# Patient Record
Sex: MALE | Race: WHITE | HISPANIC OR LATINO | Employment: FULL TIME | URBAN - METROPOLITAN AREA
[De-identification: names, ages, dates, MRNs, and addresses within clinical notes are randomized per-mention and may not be internally consistent; named-entity substitution may affect disease eponyms.]

---

## 2023-08-23 ENCOUNTER — TELEPHONE (OUTPATIENT)
Dept: FAMILY MEDICINE CLINIC | Facility: CLINIC | Age: 33
End: 2023-08-23

## 2023-08-23 ENCOUNTER — OFFICE VISIT (OUTPATIENT)
Dept: FAMILY MEDICINE CLINIC | Facility: CLINIC | Age: 33
End: 2023-08-23
Payer: COMMERCIAL

## 2023-08-23 VITALS
TEMPERATURE: 97.9 F | OXYGEN SATURATION: 100 % | HEIGHT: 73 IN | BODY MASS INDEX: 33.93 KG/M2 | HEART RATE: 58 BPM | SYSTOLIC BLOOD PRESSURE: 136 MMHG | WEIGHT: 256 LBS | DIASTOLIC BLOOD PRESSURE: 84 MMHG

## 2023-08-23 DIAGNOSIS — Z11.4 SCREENING FOR HIV (HUMAN IMMUNODEFICIENCY VIRUS): ICD-10-CM

## 2023-08-23 DIAGNOSIS — Z00.00 ANNUAL PHYSICAL EXAM: Primary | ICD-10-CM

## 2023-08-23 DIAGNOSIS — Z11.59 NEED FOR HEPATITIS C SCREENING TEST: ICD-10-CM

## 2023-08-23 DIAGNOSIS — J30.2 SEASONAL ALLERGIES: ICD-10-CM

## 2023-08-23 DIAGNOSIS — J06.9 UPPER RESPIRATORY TRACT INFECTION, UNSPECIFIED TYPE: ICD-10-CM

## 2023-08-23 DIAGNOSIS — N62 GYNECOMASTIA: ICD-10-CM

## 2023-08-23 DIAGNOSIS — L21.0 DANDRUFF IN ADULT: ICD-10-CM

## 2023-08-23 PROCEDURE — 87636 SARSCOV2 & INF A&B AMP PRB: CPT

## 2023-08-23 PROCEDURE — 99385 PREV VISIT NEW AGE 18-39: CPT

## 2023-08-23 PROCEDURE — 87651 STREP A DNA AMP PROBE: CPT

## 2023-08-23 RX ORDER — FLUTICASONE PROPIONATE 50 MCG
1 SPRAY, SUSPENSION (ML) NASAL DAILY
Qty: 18.2 ML | Refills: 0 | Status: SHIPPED | OUTPATIENT
Start: 2023-08-23

## 2023-08-23 RX ORDER — SELENIUM SULFIDE 2.5 MG/100ML
LOTION TOPICAL DAILY PRN
Qty: 118 ML | Refills: 0 | Status: SHIPPED | OUTPATIENT
Start: 2023-08-23

## 2023-08-23 RX ORDER — CETIRIZINE HYDROCHLORIDE 10 MG/1
10 TABLET ORAL DAILY
Qty: 90 TABLET | Refills: 0 | Status: SHIPPED | OUTPATIENT
Start: 2023-08-23

## 2023-08-23 NOTE — ASSESSMENT & PLAN NOTE
Large breast since childhood  Would like surgically removed   Referral placed to plastics   BMI 33.78

## 2023-08-23 NOTE — ASSESSMENT & PLAN NOTE
Symptom onset: 08/20/23   Covid/strep swab done today 08/23/23     Make sure you have a thermometer and if you feel chills or sweats check it and write it down. Take Tylenol for fevers, body aches, and headaches  Drink plenty of fluids to stay well hydrated at least 2 L per day  • Hot water with lemon or honey, warm tea, Can drink Gatorade/Powerade zero, mix with water    Use over-the-counter saline nasal spray/allergy medication for congestion, runny nose, and postnasal drip  • Over the counter Mucinex to help clear mucus. Delsym is a cough suppressant.    • Vicks to the front/back of the chest bottom of the feet with socks   Stay out of work/school until afebrile >24 hours without use of antipyretics

## 2023-08-23 NOTE — PROGRESS NOTES
[address] [place] [facility]    NAME: Black Olmedo  AGE: 29 y.o. SEX: male  : 1990     DATE: 2023     Assessment and Plan:     Problem List Items Addressed This Visit        Respiratory    Upper respiratory tract infection     Symptom onset: 23   Covid/strep swab done today 23     Make sure you have a thermometer and if you feel chills or sweats check it and write it down. Take Tylenol for fevers, body aches, and headaches  Drink plenty of fluids to stay well hydrated at least 2 L per day  • Hot water with lemon or honey, warm tea, Can drink Gatorade/Powerade zero, mix with water    Use over-the-counter saline nasal spray/allergy medication for congestion, runny nose, and postnasal drip  • Over the counter Mucinex to help clear mucus. Delsym is a cough suppressant.    • Vicks to the front/back of the chest bottom of the feet with socks   Stay out of work/school until afebrile >24 hours without use of antipyretics         Relevant Orders    Strep A PCR    Covid/Flu- Office Collect       Musculoskeletal and Integument    Dandruff in adult     Dry scalp   Has tried head and shoulders with no relief   Prescription grade shampoo sent to pharmacy          Relevant Medications    selenium sulfide (SELSUN) 2.5 % shampoo       Other    BMI 33.0-33.9,adult     Patient reportedly follows a keto diet with no effects on weight loss   Encouraged to change diet to a low carb diet as the keto diet is not sustainable long term   Physically demanding job and also goes to the gym   Interested in medication for weight loss   Referral to weight management          Relevant Orders    Lipid panel    CBC and Platelet    Comprehensive metabolic panel    Ambulatory referral to Weight Management    Gynecomastia     Large breast since childhood  Would like surgically removed   Referral placed to plastics   BMI 33.78          Relevant Orders Ambulatory Referral to Plastic Surgery    Seasonal allergies     Frequent seasonal allergies in the summer   Cetirizine and fluticasone ordered          Relevant Medications    cetirizine (ZyrTEC) 10 mg tablet    fluticasone (FLONASE) 50 mcg/act nasal spray   Other Visit Diagnoses     Annual physical exam    -  Primary    Relevant Orders    Lipid panel    Comprehensive metabolic panel    Need for hepatitis C screening test        Relevant Orders    Hepatitis C Antibody    Screening for HIV (human immunodeficiency virus)        Relevant Orders    HIV 1/2 AG/AB w Reflex [facility] for 2 yr old and above          Immunizations and preventive care screenings were discussed with patient today. Appropriate education was printed on patient's after visit summary. Counseling:  Alcohol/drug use: discussed moderation in alcohol intake, the recommendations for healthy alcohol use, and avoidance of illicit drug use. Dental Health: discussed importance of regular tooth brushing, flossing, and dental visits. Injury prevention: discussed safety/seat belts, safety helmets, smoke detectors, carbon dioxide detectors, and smoking near bedding or upholstery. Sexual health: discussed sexually transmitted diseases, partner selection, use of condoms, avoidance of unintended pregnancy, and contraceptive alternatives. · Exercise: the importance of regular exercise/physical activity was discussed. Recommend exercise 3-5 times per week for at least 30 minutes. Return in 1 year (on 8/23/2024). Chief Complaint:     Chief Complaint   Patient presents with   • Rash   • Sinusitis     Seasonal allergieS   • Physical Exam     Would like weight loss recommendation, would like referral for men breast   • Sore Throat     WHITE PATCHES ON TONSILS, X3 DAYS      History of Present Illness:     Adult Annual Physical   Patient here for a comprehensive physical exam. The patient reports problems - URI.     Diet and Physical Activity  · Diet/Nutrition: well balanced diet. · Exercise: 3-4 times a week on average. Depression Screening  PHQ-2/9 Depression Screening    Little interest or pleasure in doing things: 0 - not at all  Feeling down, depressed, or hopeless: 0 - not at all  PHQ-2 Score: 0  PHQ-2 Interpretation: Negative depression screen       General Health  · Sleep: sleeps well. · Hearing: normal - none . · Vision: no vision problems. · Dental: regular dental visits.  Health  · History of STDs?: no.     Review of Systems:     Review of Systems   Constitutional: Negative for activity change, chills, fatigue and fever. HENT: Negative for congestion, ear pain, rhinorrhea and sore throat. Eyes: Negative for pain and visual disturbance. Respiratory: Negative for cough, chest tightness and shortness of breath. Cardiovascular: Negative for chest pain, palpitations and leg swelling. Gastrointestinal: Negative for abdominal pain, constipation, diarrhea, nausea and vomiting. Genitourinary: Negative for decreased urine volume, dysuria, frequency, hematuria and urgency. Musculoskeletal: Negative for arthralgias and back pain. Skin: Negative for color change and rash. Neurological: Negative for seizures, syncope and headaches. Psychiatric/Behavioral: Negative for dysphoric mood. The patient is not nervous/anxious. All other systems reviewed and are negative. Past Medical History:     History reviewed. No pertinent past medical history. Past Surgical History:     History reviewed. No pertinent surgical history.    Social History:     Social History     Socioeconomic History   • Marital status: Single     Spouse name: None   • Number of children: None   • Years of education: None   • Highest education level: None   Occupational History   • None   Tobacco Use   • Smoking status: Some Days     Types: Cigarettes, Cigars     Start date: 2022   • Smokeless tobacco: Never   Vaping Use   • Vaping Use: Never used   Substance and Sexual Activity   • Alcohol use: Yes     Comment: SOCIALLY   • Drug use: Never   • Sexual activity: None   Other Topics Concern   • None   Social History Narrative   • None     Social Determinants of Health     Financial Resource Strain: Not on file   Food Insecurity: Not on file   Transportation Needs: Not on file   Physical Activity: Not on file   Stress: Not on file   Social Connections: Not on file   Intimate Partner Violence: Not on file   Housing Stability: Not on file      Family History:     Family History   Problem Relation Age of Onset   • Hypertension Mother    • Diabetes Mother    • Lung disease Mother    • No Known Problems Father    • Depression Sister    • No Known Problems Sister    • No Known Problems Brother    • No Known Problems Brother    • No Known Problems Brother       Current Medications:     Current Outpatient Medications   Medication Sig Dispense Refill   • cetirizine (ZyrTEC) 10 mg tablet Take 1 tablet (10 mg total) by mouth daily 90 tablet 0   • fluticasone (FLONASE) 50 mcg/act nasal spray 1 spray into each nostril daily 18.2 mL 0   • selenium sulfide (SELSUN) 2.5 % shampoo Apply topically daily as needed for dandruff 118 mL 0     No current facility-administered medications for this visit. Allergies:     No Known Allergies   Physical Exam:     /84 (BP Location: Left arm, Patient Position: Sitting, Cuff Size: Adult)   Pulse 58   Temp 97.9 °F (36.6 °C) (Temporal)   Ht 6' 1" (1.854 m)   Wt 116 kg (256 lb)   SpO2 100%   BMI 33.78 kg/m²     Physical Exam  Vitals and nursing note reviewed. Constitutional:       Appearance: Normal appearance. He is well-developed. HENT:      Head: Normocephalic and atraumatic. Right Ear: Tympanic membrane, ear canal and external ear normal. There is no impacted cerumen. Left Ear: Tympanic membrane, ear canal and external ear normal. There is no impacted cerumen.       Nose: Nose normal. Mouth/Throat:      Mouth: Mucous membranes are moist.      Pharynx: Oropharynx is clear. Eyes:      Extraocular Movements: Extraocular movements intact. Conjunctiva/sclera: Conjunctivae normal.      Pupils: Pupils are equal, round, and reactive to light. Cardiovascular:      Rate and Rhythm: Normal rate and regular rhythm. Pulses: Normal pulses. Heart sounds: Normal heart sounds. No murmur heard. Pulmonary:      Effort: Pulmonary effort is normal. No respiratory distress. Breath sounds: Normal breath sounds. Abdominal:      General: Bowel sounds are normal.      Palpations: Abdomen is soft. Tenderness: There is no abdominal tenderness. Musculoskeletal:         General: No swelling. Normal range of motion. Cervical back: Normal range of motion and neck supple. Right lower leg: No edema. Left lower leg: No edema. Skin:     General: Skin is warm and dry. Capillary Refill: Capillary refill takes less than 2 seconds. Neurological:      General: No focal deficit present. Mental Status: He is alert and oriented to person, place, and time. Mental status is at baseline. Psychiatric:         Mood and Affect: Mood normal.         Behavior: Behavior normal.         Thought Content: Thought content normal.         Judgment: Judgment normal.        TARA Gracia   [address]     BMI Counseling: Body mass index is 33.78 kg/m². The BMI is above normal. Nutrition recommendations include decreasing overall calorie intake, 3-5 servings of fruits/vegetables daily, decreasing soda and/or juice intake, increasing intake of lean protein and reducing intake of saturated fat and trans fat. Exercise recommendations include moderate aerobic physical activity for 150 minutes/week.

## 2023-08-23 NOTE — ASSESSMENT & PLAN NOTE
Patient reportedly follows a keto diet with no effects on weight loss   Encouraged to change diet to a low carb diet as the keto diet is not sustainable long term   Physically demanding job and also goes to the gym   Interested in medication for weight loss   Referral to weight management

## 2023-08-23 NOTE — ASSESSMENT & PLAN NOTE
Dry scalp   Has tried head and shoulders with no relief   Prescription grade shampoo sent to pharmacy

## 2023-08-23 NOTE — PATIENT INSTRUCTIONS
Wellness Visit for Adults   AMBULATORY CARE:   A wellness visit  is when you see your healthcare provider to get screened for health problems. Your healthcare provider will also give you advice on how to stay healthy. Write down your questions so you remember to ask them. Ask your healthcare provider how often you should have a wellness visit. What happens at a wellness visit:  Your healthcare provider will ask about your health, and your family history of health problems. This includes high blood pressure, heart disease, and cancer. He or she will ask if you have symptoms that concern you, if you smoke, and about your mood. You may also be asked about your intake of medicines, supplements, food, and alcohol. Any of the following may be done:  • Your weight  will be checked. Your height may also be checked so your body mass index (BMI) can be calculated. Your BMI shows if you are at a healthy weight. • Your blood pressure  and heart rate will be checked. Your temperature may also be checked. • Blood and urine tests  may be done. Blood tests may be done to check your cholesterol levels. Abnormal cholesterol levels increase your risk for heart disease and stroke. You may also need a blood or urine test to check for diabetes if you are at increased risk. Urine tests may be done to look for signs of an infection or kidney disease. • A physical exam  includes checking your heartbeat and lungs with a stethoscope. Your healthcare provider may also check your skin to look for sun damage. • Screening tests  may be recommended. A screening test is done to check for diseases that may not cause symptoms. The screening tests you may need depend on your age, gender, family history, and lifestyle habits. For example, colorectal screening may be recommended if you are 48years old or older. Screening tests you need if you are a woman:   • A Pap smear  is used to screen for cervical cancer.  Pap smears are usually done every 3 to 5 years depending on your age. You may need them more often if you have had abnormal Pap smear test results in the past. Ask your healthcare provider how often you should have a Pap smear. • A mammogram  is an x-ray of your breasts to screen for breast cancer. Experts recommend mammograms every 2 years starting at age 48 years. You may need a mammogram at age 52 years or younger if you have an increased risk for breast cancer. Talk to your healthcare provider about when you should start having mammograms and how often you need them. Vaccines you may need:   • Get an influenza vaccine  every year. The influenza vaccine protects you from the flu. Several types of viruses cause the flu. The viruses change over time, so new vaccines are made each year. • Get a tetanus-diphtheria (Td) booster vaccine  every 10 years. This vaccine protects you against tetanus and diphtheria. Tetanus is a severe infection that may cause painful muscle spasms and lockjaw. Diphtheria is a severe bacterial infection that causes a thick covering in the back of your mouth and throat. • Get a human papillomavirus (HPV) vaccine  if you are female and aged 23 to 32 or male 23 to 24 and never received it. This vaccine protects you from HPV infection. HPV is the most common infection spread by sexual contact. HPV may also cause vaginal, penile, and anal cancers. • Get a pneumococcal vaccine  if you are aged 72 years or older. The pneumococcal vaccine is an injection given to protect you from pneumococcal disease. Pneumococcal disease is an infection caused by pneumococcal bacteria. The infection may cause pneumonia, meningitis, or an ear infection. • Get a shingles vaccine  if you are 60 or older, even if you have had shingles before. The shingles vaccine is an injection to protect you from the varicella-zoster virus. This is the same virus that causes chickenpox.  Shingles is a painful rash that develops in people who had chickenpox or have been exposed to the virus. How to eat healthy:  My Plate is a model for planning healthy meals. It shows the types and amounts of foods that should go on your plate. Fruits and vegetables make up about half of your plate, and grains and protein make up the other half. A serving of dairy is included on the side of your plate. The amount of calories and serving sizes you need depends on your age, gender, weight, and height. Examples of healthy foods are listed below:  • Eat a variety of vegetables  such as dark green, red, and orange vegetables. You can also include canned vegetables low in sodium (salt) and frozen vegetables without added butter or sauces. • Eat a variety of fresh fruits , canned fruit in 100% juice, frozen fruit, and dried fruit. • Include whole grains. At least half of the grains you eat should be whole grains. Examples include whole-wheat bread, wheat pasta, brown rice, and whole-grain cereals such as oatmeal.    • Eat a variety of protein foods such as seafood (fish and shellfish), lean meat, and poultry without skin (turkey and chicken). Examples of lean meats include pork leg, shoulder, or tenderloin, and beef round, sirloin, tenderloin, and extra lean ground beef. Other protein foods include eggs and egg substitutes, beans, peas, soy products, nuts, and seeds. • Choose low-fat dairy products such as skim or 1% milk or low-fat yogurt, cheese, and cottage cheese. • Limit unhealthy fats  such as butter, hard margarine, and shortening. Exercise:  Exercise at least 30 minutes per day on most days of the week. Some examples of exercise include walking, biking, dancing, and swimming. You can also fit in more physical activity by taking the stairs instead of the elevator or parking farther away from stores. Include muscle strengthening activities 2 days each week. Regular exercise provides many health benefits.  It helps you manage your weight, and decreases your risk for type 2 diabetes, heart disease, stroke, and high blood pressure. Exercise can also help improve your mood. Ask your healthcare provider about the best exercise plan for you. General health and safety guidelines:   • Do not smoke. Nicotine and other chemicals in cigarettes and cigars can cause lung damage. Ask your healthcare provider for information if you currently smoke and need help to quit. E-cigarettes or smokeless tobacco still contain nicotine. Talk to your healthcare provider before you use these products. • Limit alcohol. A drink of alcohol is 12 ounces of beer, 5 ounces of wine, or 1½ ounces of liquor. • Lose weight, if needed. Being overweight increases your risk of certain health conditions. These include heart disease, high blood pressure, type 2 diabetes, and certain types of cancer. • Protect your skin. Do not sunbathe or use tanning beds. Use sunscreen with a SPF 15 or higher. Apply sunscreen at least 15 minutes before you go outside. Reapply sunscreen every 2 hours. Wear protective clothing, hats, and sunglasses when you are outside. • Drive safely. Always wear your seatbelt. Make sure everyone in your car wears a seatbelt. A seatbelt can save your life if you are in an accident. Do not use your cell phone when you are driving. This could distract you and cause an accident. Pull over if you need to make a call or send a text message. • Practice safe sex. Use latex condoms if are sexually active and have more than one partner. Your healthcare provider may recommend screening tests for sexually transmitted infections (STIs). • Wear helmets, lifejackets, and protective gear. Always wear a helmet when you ride a bike or motorcycle, go skiing, or play sports that could cause a head injury. Wear protective equipment when you play sports. Wear a lifejacket when you are on a boat or doing water sports.     © Copyright [employer] 2022 Information is for End User's use only and may not be sold, redistributed or otherwise used for commercial purposes. The above information is an  only. It is not intended as medical advice for individual conditions or treatments. Talk to your doctor, nurse or pharmacist before following any medical regimen to see if it is safe and effective for you. Wellness Visit for Adults   AMBULATORY CARE:   A wellness visit  is when you see your healthcare provider to get screened for health problems. Your healthcare provider will also give you advice on how to stay healthy. Write down your questions so you remember to ask them. Ask your healthcare provider how often you should have a wellness visit. What happens at a wellness visit:  Your healthcare provider will ask about your health, and your family history of health problems. This includes high blood pressure, heart disease, and cancer. He or she will ask if you have symptoms that concern you, if you smoke, and about your mood. You may also be asked about your intake of medicines, supplements, food, and alcohol. Any of the following may be done:  • Your weight  will be checked. Your height may also be checked so your body mass index (BMI) can be calculated. Your BMI shows if you are at a healthy weight. • Your blood pressure  and heart rate will be checked. Your temperature may also be checked. • Blood and urine tests  may be done. Blood tests may be done to check your cholesterol levels. Abnormal cholesterol levels increase your risk for heart disease and stroke. You may also need a blood or urine test to check for diabetes if you are at increased risk. Urine tests may be done to look for signs of an infection or kidney disease. • A physical exam  includes checking your heartbeat and lungs with a stethoscope. Your healthcare provider may also check your skin to look for sun damage. • Screening tests  may be recommended.  A screening test is done to check for diseases that may not cause symptoms. The screening tests you may need depend on your age, gender, family history, and lifestyle habits. For example, colorectal screening may be recommended if you are 48years old or older. Screening tests you need if you are a woman:   • A Pap smear  is used to screen for cervical cancer. Pap smears are usually done every 3 to 5 years depending on your age. You may need them more often if you have had abnormal Pap smear test results in the past. Ask your healthcare provider how often you should have a Pap smear. • A mammogram  is an x-ray of your breasts to screen for breast cancer. Experts recommend mammograms every 2 years starting at age 48 years. You may need a mammogram at age 52 years or younger if you have an increased risk for breast cancer. Talk to your healthcare provider about when you should start having mammograms and how often you need them. Vaccines you may need:   • Get an influenza vaccine  every year. The influenza vaccine protects you from the flu. Several types of viruses cause the flu. The viruses change over time, so new vaccines are made each year. • Get a tetanus-diphtheria (Td) booster vaccine  every 10 years. This vaccine protects you against tetanus and diphtheria. Tetanus is a severe infection that may cause painful muscle spasms and lockjaw. Diphtheria is a severe bacterial infection that causes a thick covering in the back of your mouth and throat. • Get a human papillomavirus (HPV) vaccine  if you are female and aged 23 to 32 or male 23 to 24 and never received it. This vaccine protects you from HPV infection. HPV is the most common infection spread by sexual contact. HPV may also cause vaginal, penile, and anal cancers. • Get a pneumococcal vaccine  if you are aged 72 years or older. The pneumococcal vaccine is an injection given to protect you from pneumococcal disease.  Pneumococcal disease is an infection caused by pneumococcal bacteria. The infection may cause pneumonia, meningitis, or an ear infection. • Get a shingles vaccine  if you are 60 or older, even if you have had shingles before. The shingles vaccine is an injection to protect you from the varicella-zoster virus. This is the same virus that causes chickenpox. Shingles is a painful rash that develops in people who had chickenpox or have been exposed to the virus. How to eat healthy:  My Plate is a model for planning healthy meals. It shows the types and amounts of foods that should go on your plate. Fruits and vegetables make up about half of your plate, and grains and protein make up the other half. A serving of dairy is included on the side of your plate. The amount of calories and serving sizes you need depends on your age, gender, weight, and height. Examples of healthy foods are listed below:  • Eat a variety of vegetables  such as dark green, red, and orange vegetables. You can also include canned vegetables low in sodium (salt) and frozen vegetables without added butter or sauces. • Eat a variety of fresh fruits , canned fruit in 100% juice, frozen fruit, and dried fruit. • Include whole grains. At least half of the grains you eat should be whole grains. Examples include whole-wheat bread, wheat pasta, brown rice, and whole-grain cereals such as oatmeal.    • Eat a variety of protein foods such as seafood (fish and shellfish), lean meat, and poultry without skin (turkey and chicken). Examples of lean meats include pork leg, shoulder, or tenderloin, and beef round, sirloin, tenderloin, and extra lean ground beef. Other protein foods include eggs and egg substitutes, beans, peas, soy products, nuts, and seeds. • Choose low-fat dairy products such as skim or 1% milk or low-fat yogurt, cheese, and cottage cheese. • Limit unhealthy fats  such as butter, hard margarine, and shortening.        Exercise:  Exercise at least 30 minutes per day on most days of the week. Some examples of exercise include walking, biking, dancing, and swimming. You can also fit in more physical activity by taking the stairs instead of the elevator or parking farther away from stores. Include muscle strengthening activities 2 days each week. Regular exercise provides many health benefits. It helps you manage your weight, and decreases your risk for type 2 diabetes, heart disease, stroke, and high blood pressure. Exercise can also help improve your mood. Ask your healthcare provider about the best exercise plan for you. General health and safety guidelines:   • Do not smoke. Nicotine and other chemicals in cigarettes and cigars can cause lung damage. Ask your healthcare provider for information if you currently smoke and need help to quit. E-cigarettes or smokeless tobacco still contain nicotine. Talk to your healthcare provider before you use these products. • Limit alcohol. A drink of alcohol is 12 ounces of beer, 5 ounces of wine, or 1½ ounces of liquor. • Lose weight, if needed. Being overweight increases your risk of certain health conditions. These include heart disease, high blood pressure, type 2 diabetes, and certain types of cancer. • Protect your skin. Do not sunbathe or use tanning beds. Use sunscreen with a SPF 15 or higher. Apply sunscreen at least 15 minutes before you go outside. Reapply sunscreen every 2 hours. Wear protective clothing, hats, and sunglasses when you are outside. • Drive safely. Always wear your seatbelt. Make sure everyone in your car wears a seatbelt. A seatbelt can save your life if you are in an accident. Do not use your cell phone when you are driving. This could distract you and cause an accident. Pull over if you need to make a call or send a text message. • Practice safe sex. Use latex condoms if are sexually active and have more than one partner.  Your healthcare provider may recommend screening tests for sexually transmitted infections (STIs). • Wear helmets, lifejackets, and protective gear. Always wear a helmet when you ride a bike or motorcycle, go skiing, or play sports that could cause a head injury. Wear protective equipment when you play sports. Wear a lifejacket when you are on a boat or doing water sports. © Copyright [employer] 2022 Information is for End User's use only and may not be sold, redistributed or otherwise used for commercial purposes. The above information is an  only. It is not intended as medical advice for individual conditions or treatments. Talk to your doctor, nurse or pharmacist before following any medical regimen to see if it is safe and effective for you.

## 2023-08-23 NOTE — TELEPHONE ENCOUNTER
Was seen today 8/23/2023  as new patient Doris Alonso will call [place] office  tomorrow with insurance information  He needs to get information from his employer

## 2023-08-24 LAB
FLUAV RNA RESP QL NAA+PROBE: NEGATIVE
FLUBV RNA RESP QL NAA+PROBE: NEGATIVE
S PYO DNA THROAT QL NAA+PROBE: NOT DETECTED
SARS-COV-2 RNA RESP QL NAA+PROBE: NEGATIVE

## 2023-11-29 ENCOUNTER — TELEPHONE (OUTPATIENT)
Dept: PLASTIC SURGERY | Facility: CLINIC | Age: 33
End: 2023-11-29

## 2023-11-29 NOTE — TELEPHONE ENCOUNTER
Called pt to see if they were still interested in a consult for gynecomastia. No vm set up to leave message.

## 2024-04-16 ENCOUNTER — HOSPITAL ENCOUNTER (EMERGENCY)
Facility: HOSPITAL | Age: 34
Discharge: HOME | End: 2024-04-17
Attending: EMERGENCY MEDICINE | Admitting: EMERGENCY MEDICINE
Payer: COMMERCIAL

## 2024-04-16 VITALS
SYSTOLIC BLOOD PRESSURE: 146 MMHG | DIASTOLIC BLOOD PRESSURE: 76 MMHG | HEART RATE: 58 BPM | TEMPERATURE: 98 F | WEIGHT: 250.44 LBS | RESPIRATION RATE: 16 BRPM | OXYGEN SATURATION: 100 % | BODY MASS INDEX: 33.04 KG/M2

## 2024-04-16 DIAGNOSIS — K13.79 ORAL BLEEDING: Primary | ICD-10-CM

## 2024-04-16 DIAGNOSIS — K08.409 S/P TOOTH EXTRACTION: ICD-10-CM

## 2024-04-16 PROCEDURE — 99282 EMERGENCY DEPT VISIT SF MDM: CPT

## 2024-04-17 ENCOUNTER — OFFICE VISIT (OUTPATIENT)
Dept: FAMILY MEDICINE CLINIC | Facility: CLINIC | Age: 34
End: 2024-04-17
Payer: COMMERCIAL

## 2024-04-17 VITALS
WEIGHT: 251.6 LBS | HEART RATE: 82 BPM | DIASTOLIC BLOOD PRESSURE: 84 MMHG | OXYGEN SATURATION: 96 % | HEIGHT: 73 IN | SYSTOLIC BLOOD PRESSURE: 132 MMHG | BODY MASS INDEX: 33.34 KG/M2 | TEMPERATURE: 97.9 F

## 2024-04-17 DIAGNOSIS — Z13.6 SCREENING FOR CARDIOVASCULAR CONDITION: ICD-10-CM

## 2024-04-17 DIAGNOSIS — Z13.1 SCREENING FOR DIABETES MELLITUS: ICD-10-CM

## 2024-04-17 DIAGNOSIS — Z11.4 SCREENING FOR HIV (HUMAN IMMUNODEFICIENCY VIRUS): ICD-10-CM

## 2024-04-17 DIAGNOSIS — Z11.59 NEED FOR HEPATITIS C SCREENING TEST: ICD-10-CM

## 2024-04-17 DIAGNOSIS — H61.21 IMPACTED CERUMEN, RIGHT EAR: ICD-10-CM

## 2024-04-17 DIAGNOSIS — Z09 FOLLOW-UP EXAM: Primary | ICD-10-CM

## 2024-04-17 DIAGNOSIS — L21.0 DANDRUFF IN ADULT: ICD-10-CM

## 2024-04-17 DIAGNOSIS — J30.2 SEASONAL ALLERGIES: ICD-10-CM

## 2024-04-17 PROCEDURE — 99282 EMERGENCY DEPT VISIT SF MDM: CPT | Performed by: PHYSICIAN ASSISTANT

## 2024-04-17 PROCEDURE — 99395 PREV VISIT EST AGE 18-39: CPT

## 2024-04-17 PROCEDURE — 69210 REMOVE IMPACTED EAR WAX UNI: CPT

## 2024-04-17 RX ORDER — KETOCONAZOLE 20 MG/ML
1 SHAMPOO TOPICAL 2 TIMES WEEKLY
Qty: 120 ML | Refills: 3 | Status: SHIPPED | OUTPATIENT
Start: 2024-04-18

## 2024-04-17 RX ORDER — CETIRIZINE HYDROCHLORIDE 10 MG/1
10 TABLET ORAL DAILY
Qty: 90 TABLET | Refills: 3 | Status: SHIPPED | OUTPATIENT
Start: 2024-04-17

## 2024-04-17 NOTE — DISCHARGE INSTRUCTIONS
Póngase en contacto con el dentista que realizó el procedimiento mañana para un seguimiento adicional.

## 2024-04-17 NOTE — ED PROVIDER NOTES
History  Chief Complaint   Patient presents with    Dental Pain     Pt had tooth removed on Friday. Started with gum bleeding approx 1 hr ago.      This is a 33-year-old male presenting to ED for evaluation of dental bleeding.  Patient states he had a tooth extracted on Friday and started with bleeding today.  Patient states that he was seen at the dentist earlier today as he was just spitting blood earlier today.  The dentist informed him that there is a clot in the area and this is what was causing the bleeding but it was appropriate healing.  Patient states approximately 1 hour prior to arrival he started with increased bleeding, applied pressure to the area with gauze and tissues without improvement of bleeding.  Patient denies having a stitch in the area that was removed at all.  No CP, LH, HA, dizziness, weakness.      History provided by:  Patient and significant other   used: Yes (Significant other)        Prior to Admission Medications   Prescriptions Last Dose Informant Patient Reported? Taking?   cetirizine (ZyrTEC) 10 mg tablet   No No   Sig: Take 1 tablet (10 mg total) by mouth daily   fluticasone (FLONASE) 50 mcg/act nasal spray   No No   Si spray into each nostril daily   selenium sulfide (SELSUN) 2.5 % shampoo   No No   Sig: Apply topically daily as needed for dandruff      Facility-Administered Medications: None       History reviewed. No pertinent past medical history.    History reviewed. No pertinent surgical history.    Family History   Problem Relation Age of Onset    Hypertension Mother     Diabetes Mother     Lung disease Mother     No Known Problems Father     Depression Sister     No Known Problems Sister     No Known Problems Brother     No Known Problems Brother     No Known Problems Brother      I have reviewed and agree with the history as documented.    E-Cigarette/Vaping    E-Cigarette Use Never User      E-Cigarette/Vaping Substances    Nicotine No     THC No      CBD No     Flavoring No     Other No     Unknown No      Social History     Tobacco Use    Smoking status: Some Days     Types: Cigarettes, Cigars     Start date: 2022    Smokeless tobacco: Never   Vaping Use    Vaping status: Never Used   Substance Use Topics    Alcohol use: Yes     Comment: SOCIALLY    Drug use: Never       Review of Systems   HENT:  Positive for dental problem.    All other systems reviewed and are negative.      Physical Exam  Physical Exam  Vitals reviewed.   Constitutional:       General: He is not in acute distress.     Appearance: Normal appearance. He is well-developed and well-groomed. He is not ill-appearing.   HENT:      Head: Normocephalic and atraumatic.      Right Ear: External ear normal.      Left Ear: External ear normal.      Nose: Nose normal.      Mouth/Throat:      Lips: Pink.      Mouth: Mucous membranes are moist.      Dentition: Abnormal dentition.      Tongue: No lesions. Tongue does not deviate from midline.      Palate: No mass and lesions.      Pharynx: Oropharynx is clear.      Comments: Bleeding from the area of extraction approximately the area of the left lower first molar.   Eyes:      General: No scleral icterus.     Conjunctiva/sclera: Conjunctivae normal.   Cardiovascular:      Rate and Rhythm: Normal rate and regular rhythm.   Pulmonary:      Effort: Pulmonary effort is normal.      Breath sounds: No stridor.   Abdominal:      General: There is no distension.   Musculoskeletal:         General: No deformity. Normal range of motion.      Cervical back: Normal range of motion.   Skin:     Coloration: Skin is not jaundiced or pale.      Findings: No lesion or rash.   Neurological:      Mental Status: He is alert and oriented to person, place, and time.   Psychiatric:         Mood and Affect: Mood normal.         Behavior: Behavior normal. Behavior is cooperative.         Vital Signs  ED Triage Vitals   Temperature Pulse Respirations Blood Pressure SpO2    04/16/24 2255 04/16/24 2254 04/16/24 2254 04/16/24 2254 04/16/24 2254   98 °F (36.7 °C) 58 16 146/76 100 %      Temp Source Heart Rate Source Patient Position - Orthostatic VS BP Location FiO2 (%)   04/16/24 2255 04/16/24 2254 04/16/24 2254 04/16/24 2254 --   Oral Monitor Sitting Right arm       Pain Score       --                  Vitals:    04/16/24 2254   BP: 146/76   Pulse: 58   Patient Position - Orthostatic VS: Sitting         Visual Acuity      ED Medications  Medications - No data to display    Diagnostic Studies  Results Reviewed       None                   No orders to display              Procedures  Procedures         ED Course                                             Medical Decision Making      DDx including but not limited to: post-op complication, gingival bleeding, dry socket.    Patient presenting to ED for for evaluation of bleeding from the area of a dental extraction that was done on Friday.  Applied warm tea bags to the area for approximately 30 to 45 minutes.  Bleeding ceased and a clot was formed in the area.  Patient comfortable with discharge at this time.  Instructed patient to follow-up with dentist later this week.    Prior to discharge, discharge instructions were discussed with patient at bedside. Patient was provided both verbal and written instructions. Patient is understanding of the discharge instructions and is agreeable to plan of care. Return precautions were discussed with patient bedside, patient verbalized understanding of signs and symptoms that would necessitate return to the ED. All questions were answered. Patient was comfortable with the plan of care and discharged to home.     Dispo: discharge home with follow up to Dentist. Patient stable, in no acute distress and non-toxic at discharge.    Problems Addressed:  Oral bleeding: acute illness or injury  S/P tooth extraction: acute illness or injury             Disposition  Final diagnoses:   Oral bleeding   S/P  tooth extraction     Time reflects when diagnosis was documented in both MDM as applicable and the Disposition within this note       Time User Action Codes Description Comment    4/16/2024 11:49 PM Akanksha Limon [K13.79] Oral bleeding     4/16/2024 11:49 PM Akanksha Limon [K08.409] S/P tooth extraction           ED Disposition       ED Disposition   Discharge    Condition   Stable    Date/Time   Tue Apr 16, 2024 11:49 PM    Comment   Paolo Dueñasino discharge to home/self care.             Follow-up Information       Follow up With Specialties Details Why Contact Info    TARA Keith Nurse Practitioner Schedule an appointment as soon as possible for a visit  As needed [id number] [place] [id number]  [phone]              Discharge Medication List as of 4/16/2024 11:51 PM        CONTINUE these medications which have NOT CHANGED    Details   cetirizine (ZyrTEC) 10 mg tablet Take 1 tablet (10 mg total) by mouth daily, Starting Wed 8/23/2023, Normal      fluticasone (FLONASE) 50 mcg/act nasal spray 1 spray into each nostril daily, Starting Wed 8/23/2023, Normal      selenium sulfide (SELSUN) 2.5 % shampoo Apply topically daily as needed for dandruff, Starting Wed 8/23/2023, Normal             No discharge procedures on file.    PDMP Review       None            ED Provider  Electronically Signed by JOY Doll PA-C  04/17/24 0324

## 2024-04-17 NOTE — PROGRESS NOTES
ADULT ANNUAL PHYSICAL  [facility] - [facility]    NAME: Paolo Monge  AGE: 33 y.o. SEX: male  : 1990     DATE: 2024     Assessment and Plan:     Problem List Items Addressed This Visit          Musculoskeletal and Integument    Dandruff in adult     Dry scalp   Has tried head and shoulders with no relief   Prescription grade shampoo sent to pharmacy          Relevant Medications    ketoconazole (NIZORAL) 2 % shampoo       Other    Seasonal allergies     Frequent seasonal allergies in the summer   Cetirizine and fluticasone ordered          Relevant Medications    cetirizine (ZyrTEC) 10 mg tablet    BMI 33.0-33.9,adult    Relevant Orders    CBC and Platelet     Other Visit Diagnoses       Follow-up exam    -  Primary    Screening for diabetes mellitus        Relevant Orders    Comprehensive metabolic panel    Screening for cardiovascular condition        Relevant Orders    Lipid panel    CBC and Platelet    Screening for HIV (human immunodeficiency virus)        Relevant Orders    HIV 1/2 AB/AG w Reflex [facility] for 2 yr old and above    Need for hepatitis C screening test        Relevant Orders    Hepatitis C antibody            Immunizations and preventive care screenings were discussed with patient today. Appropriate education was printed on patient's after visit summary.    Counseling:  Alcohol/drug use: discussed moderation in alcohol intake, the recommendations for healthy alcohol use, and avoidance of illicit drug use.  Dental Health: discussed importance of regular tooth brushing, flossing, and dental visits.  Injury prevention: discussed safety/seat belts, safety helmets, smoke detectors, carbon dioxide detectors, and smoking near bedding or upholstery.  Sexual health: discussed sexually transmitted diseases, partner selection, use of condoms, avoidance of unintended pregnancy, and contraceptive alternatives.  Exercise: the importance of  regular exercise/physical activity was discussed. Recommend exercise 3-5 times per week for at least 30 minutes.          No follow-ups on file.     Chief Complaint:     Chief Complaint   Patient presents with    Annual Exam     Rash on head      History of Present Illness:     Adult Annual Physical   Patient here for a comprehensive physical exam. The patient reports problems - dandruff .    Diet and Physical Activity  Diet/Nutrition: well balanced diet.   Exercise: no formal exercise.      Depression Screening  PHQ-2/9 Depression Screening    Little interest or pleasure in doing things: 0 - not at all  Feeling down, depressed, or hopeless: 0 - not at all  PHQ-2 Score: 0  PHQ-2 Interpretation: Negative depression screen       General Health  Sleep: sleeps well.   Hearing: normal - none .  Vision: no vision problems.   Dental: regular dental visits.        Health  History of STDs?: no.         Review of Systems:     Review of Systems   Constitutional:  Negative for activity change, appetite change, chills and fever.   HENT:  Negative for congestion, ear pain, postnasal drip and sore throat.    Eyes:  Negative for pain and visual disturbance.   Respiratory:  Negative for cough and shortness of breath.    Cardiovascular:  Negative for chest pain and palpitations.   Gastrointestinal:  Negative for abdominal pain and vomiting.   Genitourinary:  Negative for dysuria and hematuria.   Musculoskeletal:  Negative for arthralgias and back pain.   Skin:  Negative for color change and rash.   Neurological:  Negative for seizures and syncope.   Psychiatric/Behavioral:  Negative for decreased concentration. The patient is not nervous/anxious.    All other systems reviewed and are negative.     Past Medical History:     History reviewed. No pertinent past medical history.   Past Surgical History:     History reviewed. No pertinent surgical history.   Social History:     Social History     Socioeconomic History    Marital status:  "Single     Spouse name: None    Number of children: None    Years of education: None    Highest education level: None   Occupational History    None   Tobacco Use    Smoking status: Some Days     Types: Cigarettes, Cigars     Start date: 2022     Passive exposure: Current    Smokeless tobacco: Never   Vaping Use    Vaping status: Never Used   Substance and Sexual Activity    Alcohol use: Yes     Comment: SOCIALLY    Drug use: Never    Sexual activity: Yes     Partners: Female   Other Topics Concern    None   Social History Narrative    None     Social Determinants of Health     Financial Resource Strain: Not on file   Food Insecurity: Not on file   Transportation Needs: Not on file   Physical Activity: Not on file   Stress: Not on file   Social Connections: Not on file   Intimate Partner Violence: Not on file   Housing Stability: Not on file      Family History:     Family History   Problem Relation Age of Onset    Hypertension Mother     Diabetes Mother     Lung disease Mother     No Known Problems Father     Depression Sister     No Known Problems Sister     No Known Problems Brother     No Known Problems Brother     No Known Problems Brother       Current Medications:     Current Outpatient Medications   Medication Sig Dispense Refill    cetirizine (ZyrTEC) 10 mg tablet Take 1 tablet (10 mg total) by mouth daily 90 tablet 3    ketoconazole (NIZORAL) 2 % shampoo Apply 1 Application topically 2 (two) times a week 120 mL 3     No current facility-administered medications for this visit.      Allergies:     No Known Allergies   Physical Exam:     /84 (BP Location: Left arm, Patient Position: Sitting, Cuff Size: Large)   Pulse 82   Temp 97.9 °F (36.6 °C) (Temporal)   Ht 6' 1\" (1.854 m)   Wt 114 kg (251 lb 9.6 oz)   SpO2 96%   BMI 33.19 kg/m²     Physical Exam  Vitals and nursing note reviewed.   Constitutional:       General: He is not in acute distress.     Appearance: Normal appearance. He is " well-developed and normal weight.   HENT:      Head: Normocephalic and atraumatic.      Right Ear: Tympanic membrane, ear canal and external ear normal. There is impacted cerumen.      Left Ear: Tympanic membrane, ear canal and external ear normal.      Nose: Nose normal.      Mouth/Throat:      Mouth: Mucous membranes are moist.      Pharynx: Oropharynx is clear.   Eyes:      Extraocular Movements: Extraocular movements intact.      Conjunctiva/sclera: Conjunctivae normal.      Pupils: Pupils are equal, round, and reactive to light.   Cardiovascular:      Rate and Rhythm: Normal rate and regular rhythm.      Pulses: Normal pulses.      Heart sounds: Normal heart sounds. No murmur heard.  Pulmonary:      Effort: Pulmonary effort is normal. No respiratory distress.      Breath sounds: Normal breath sounds.   Abdominal:      General: Bowel sounds are normal.      Palpations: Abdomen is soft.      Tenderness: There is no abdominal tenderness.   Musculoskeletal:         General: No swelling.      Cervical back: Normal range of motion and neck supple.   Skin:     General: Skin is warm and dry.      Capillary Refill: Capillary refill takes less than 2 seconds.   Neurological:      General: No focal deficit present.      Mental Status: He is alert and oriented to person, place, and time. Mental status is at baseline.   Psychiatric:         Mood and Affect: Mood normal.         Behavior: Behavior normal.         Thought Content: Thought content normal.         Judgment: Judgment normal.        Ear cerumen removal    Date/Time: 4/17/2024 6:20 PM    Performed by: TARA Keith  Authorized by: TARA Keith  Universal Protocol:  Procedure performed by:  Consent: Verbal consent obtained.  Risks and benefits: risks, benefits and alternatives were discussed  Consent given by: patient  Timeout called at: 4/17/2024 6:26 PM.  Patient understanding: patient states understanding of the procedure being  performed    Patient location:  Clinic  Procedure details:     Local anesthetic:  None    Location:  R ear    Procedure type: irrigation with instrumentation      Instrumentation: curette      Approach:  External  Post-procedure details:     Complication:  None    Hearing quality:  Normal    Patient tolerance of procedure:  Tolerated well, no immediate complications      TARA Keith   [facility]

## 2024-04-18 ENCOUNTER — APPOINTMENT (OUTPATIENT)
Dept: LAB | Facility: CLINIC | Age: 34
End: 2024-04-18
Payer: COMMERCIAL

## 2024-04-18 DIAGNOSIS — Z11.4 SCREENING FOR HIV (HUMAN IMMUNODEFICIENCY VIRUS): ICD-10-CM

## 2024-04-18 DIAGNOSIS — Z11.59 NEED FOR HEPATITIS C SCREENING TEST: ICD-10-CM

## 2024-04-18 DIAGNOSIS — Z13.6 SCREENING FOR CARDIOVASCULAR CONDITION: ICD-10-CM

## 2024-04-18 DIAGNOSIS — Z13.1 SCREENING FOR DIABETES MELLITUS: ICD-10-CM

## 2024-04-18 LAB
ERYTHROCYTE [DISTWIDTH] IN BLOOD BY AUTOMATED COUNT: 15.1 % (ref 11.6–15.1)
HCT VFR BLD AUTO: 43.3 % (ref 36.5–49.3)
HGB BLD-MCNC: 13 G/DL (ref 12–17)
MCH RBC QN AUTO: 23 PG (ref 26.8–34.3)
MCHC RBC AUTO-ENTMCNC: 30 G/DL (ref 31.4–37.4)
MCV RBC AUTO: 77 FL (ref 82–98)
PLATELET # BLD AUTO: 135 THOUSANDS/UL (ref 149–390)
PMV BLD AUTO: 12.8 FL (ref 8.9–12.7)
RBC # BLD AUTO: 5.64 MILLION/UL (ref 3.88–5.62)
WBC # BLD AUTO: 5.27 THOUSAND/UL (ref 4.31–10.16)

## 2024-04-18 PROCEDURE — 87389 HIV-1 AG W/HIV-1&-2 AB AG IA: CPT

## 2024-04-18 PROCEDURE — 36415 COLL VENOUS BLD VENIPUNCTURE: CPT

## 2024-04-18 PROCEDURE — 80061 LIPID PANEL: CPT

## 2024-04-18 PROCEDURE — 85027 COMPLETE CBC AUTOMATED: CPT

## 2024-04-18 PROCEDURE — 86803 HEPATITIS C AB TEST: CPT

## 2024-04-18 PROCEDURE — 80053 COMPREHEN METABOLIC PANEL: CPT

## 2024-04-19 LAB
ALBUMIN SERPL BCP-MCNC: 4.4 G/DL (ref 3.5–5)
ALP SERPL-CCNC: 67 U/L (ref 34–104)
ALT SERPL W P-5'-P-CCNC: 25 U/L (ref 7–52)
ANION GAP SERPL CALCULATED.3IONS-SCNC: 5 MMOL/L (ref 4–13)
AST SERPL W P-5'-P-CCNC: 20 U/L (ref 13–39)
BILIRUB SERPL-MCNC: 0.56 MG/DL (ref 0.2–1)
BUN SERPL-MCNC: 10 MG/DL (ref 5–25)
CALCIUM SERPL-MCNC: 9.6 MG/DL (ref 8.4–10.2)
CHLORIDE SERPL-SCNC: 102 MMOL/L (ref 96–108)
CHOLEST SERPL-MCNC: 164 MG/DL
CO2 SERPL-SCNC: 32 MMOL/L (ref 21–32)
CREAT SERPL-MCNC: 0.76 MG/DL (ref 0.6–1.3)
GFR SERPL CREATININE-BSD FRML MDRD: 121 ML/MIN/1.73SQ M
GLUCOSE P FAST SERPL-MCNC: 100 MG/DL (ref 65–99)
HCV AB SER QL: NORMAL
HDLC SERPL-MCNC: 55 MG/DL
HIV 1+2 AB+HIV1 P24 AG SERPL QL IA: NORMAL
HIV 2 AB SERPL QL IA: NORMAL
HIV1 AB SERPL QL IA: NORMAL
HIV1 P24 AG SERPL QL IA: NORMAL
LDLC SERPL CALC-MCNC: 94 MG/DL (ref 0–100)
NONHDLC SERPL-MCNC: 109 MG/DL
POTASSIUM SERPL-SCNC: 4.2 MMOL/L (ref 3.5–5.3)
PROT SERPL-MCNC: 7.4 G/DL (ref 6.4–8.4)
SODIUM SERPL-SCNC: 139 MMOL/L (ref 135–147)
TRIGL SERPL-MCNC: 74 MG/DL

## 2024-05-28 DIAGNOSIS — J30.2 SEASONAL ALLERGIES: ICD-10-CM

## 2024-05-28 RX ORDER — CETIRIZINE HYDROCHLORIDE 10 MG/1
10 TABLET ORAL DAILY
Qty: 90 TABLET | Refills: 1 | Status: SHIPPED | OUTPATIENT
Start: 2024-05-28

## 2025-01-21 ENCOUNTER — HOSPITAL ENCOUNTER (EMERGENCY)
Facility: HOSPITAL | Age: 35
Discharge: HOME | End: 2025-01-21
Attending: EMERGENCY MEDICINE
Payer: COMMERCIAL

## 2025-01-21 ENCOUNTER — APPOINTMENT (EMERGENCY)
Dept: CT IMAGING | Facility: HOSPITAL | Age: 35
End: 2025-01-21
Payer: COMMERCIAL

## 2025-01-21 VITALS
TEMPERATURE: 98.2 F | BODY MASS INDEX: 34.99 KG/M2 | OXYGEN SATURATION: 98 % | HEART RATE: 86 BPM | DIASTOLIC BLOOD PRESSURE: 79 MMHG | RESPIRATION RATE: 16 BRPM | WEIGHT: 265.21 LBS | SYSTOLIC BLOOD PRESSURE: 147 MMHG

## 2025-01-21 DIAGNOSIS — R10.9 ACUTE ABDOMINAL PAIN: ICD-10-CM

## 2025-01-21 DIAGNOSIS — R11.2 NAUSEA AND VOMITING: Primary | ICD-10-CM

## 2025-01-21 LAB
ALBUMIN SERPL BCG-MCNC: 4.8 G/DL (ref 3.5–5)
ALP SERPL-CCNC: 77 U/L (ref 34–104)
ALT SERPL W P-5'-P-CCNC: 35 U/L (ref 7–52)
ANION GAP SERPL CALCULATED.3IONS-SCNC: 7 MMOL/L (ref 4–13)
APTT PPP: 30 SECONDS (ref 23–34)
AST SERPL W P-5'-P-CCNC: 26 U/L (ref 13–39)
BACTERIA UR QL AUTO: ABNORMAL /HPF
BASOPHILS # BLD AUTO: 0.01 THOUSANDS/ΜL (ref 0–0.1)
BASOPHILS NFR BLD AUTO: 0 % (ref 0–1)
BILIRUB SERPL-MCNC: 0.6 MG/DL (ref 0.2–1)
BILIRUB UR QL STRIP: NEGATIVE
BUN SERPL-MCNC: 13 MG/DL (ref 5–25)
CALCIUM SERPL-MCNC: 10 MG/DL (ref 8.4–10.2)
CHLORIDE SERPL-SCNC: 99 MMOL/L (ref 96–108)
CLARITY UR: CLEAR
CO2 SERPL-SCNC: 29 MMOL/L (ref 21–32)
COLOR UR: YELLOW
CREAT SERPL-MCNC: 0.92 MG/DL (ref 0.6–1.3)
EOSINOPHIL # BLD AUTO: 0.11 THOUSAND/ΜL (ref 0–0.61)
EOSINOPHIL NFR BLD AUTO: 2 % (ref 0–6)
ERYTHROCYTE [DISTWIDTH] IN BLOOD BY AUTOMATED COUNT: 15.9 % (ref 11.6–15.1)
GFR SERPL CREATININE-BSD FRML MDRD: 109 ML/MIN/1.73SQ M
GLUCOSE SERPL-MCNC: 86 MG/DL (ref 65–140)
GLUCOSE UR STRIP-MCNC: NEGATIVE MG/DL
HCT VFR BLD AUTO: 47.5 % (ref 36.5–49.3)
HGB BLD-MCNC: 14.7 G/DL (ref 12–17)
HGB UR QL STRIP.AUTO: NEGATIVE
IMM GRANULOCYTES # BLD AUTO: 0.01 THOUSAND/UL (ref 0–0.2)
IMM GRANULOCYTES NFR BLD AUTO: 0 % (ref 0–2)
INR PPP: 1.14 (ref 0.85–1.19)
KETONES UR STRIP-MCNC: ABNORMAL MG/DL
LACTATE SERPL-SCNC: 0.9 MMOL/L (ref 0.5–2)
LEUKOCYTE ESTERASE UR QL STRIP: ABNORMAL
LIPASE SERPL-CCNC: 10 U/L (ref 11–82)
LYMPHOCYTES # BLD AUTO: 1.73 THOUSANDS/ΜL (ref 0.6–4.47)
LYMPHOCYTES NFR BLD AUTO: 25 % (ref 14–44)
MAGNESIUM SERPL-MCNC: 2 MG/DL (ref 1.9–2.7)
MCH RBC QN AUTO: 22.9 PG (ref 26.8–34.3)
MCHC RBC AUTO-ENTMCNC: 30.9 G/DL (ref 31.4–37.4)
MCV RBC AUTO: 74 FL (ref 82–98)
MONOCYTES # BLD AUTO: 0.88 THOUSAND/ΜL (ref 0.17–1.22)
MONOCYTES NFR BLD AUTO: 13 % (ref 4–12)
MUCOUS THREADS UR QL AUTO: ABNORMAL
NEUTROPHILS # BLD AUTO: 4.09 THOUSANDS/ΜL (ref 1.85–7.62)
NEUTS SEG NFR BLD AUTO: 60 % (ref 43–75)
NITRITE UR QL STRIP: NEGATIVE
NON-SQ EPI CELLS URNS QL MICRO: ABNORMAL /HPF
NRBC BLD AUTO-RTO: 0 /100 WBCS
PH UR STRIP.AUTO: 7 [PH] (ref 4.5–8)
PLATELET # BLD AUTO: 150 THOUSANDS/UL (ref 149–390)
PMV BLD AUTO: 12 FL (ref 8.9–12.7)
POTASSIUM SERPL-SCNC: 4.2 MMOL/L (ref 3.5–5.3)
PROT SERPL-MCNC: 8.4 G/DL (ref 6.4–8.4)
PROT UR STRIP-MCNC: NEGATIVE MG/DL
PROTHROMBIN TIME: 14.8 SECONDS (ref 12.3–15)
RBC # BLD AUTO: 6.43 MILLION/UL (ref 3.88–5.62)
RBC #/AREA URNS AUTO: ABNORMAL /HPF
SODIUM SERPL-SCNC: 135 MMOL/L (ref 135–147)
SP GR UR STRIP.AUTO: 1.02 (ref 1–1.03)
UROBILINOGEN UR QL STRIP.AUTO: 0.2 E.U./DL
WBC # BLD AUTO: 6.83 THOUSAND/UL (ref 4.31–10.16)
WBC #/AREA URNS AUTO: ABNORMAL /HPF

## 2025-01-21 PROCEDURE — 80053 COMPREHEN METABOLIC PANEL: CPT | Performed by: EMERGENCY MEDICINE

## 2025-01-21 PROCEDURE — 83605 ASSAY OF LACTIC ACID: CPT | Performed by: EMERGENCY MEDICINE

## 2025-01-21 PROCEDURE — 96374 THER/PROPH/DIAG INJ IV PUSH: CPT

## 2025-01-21 PROCEDURE — 99283 EMERGENCY DEPT VISIT LOW MDM: CPT

## 2025-01-21 PROCEDURE — 99243 OFF/OP CNSLTJ NEW/EST LOW 30: CPT

## 2025-01-21 PROCEDURE — 83690 ASSAY OF LIPASE: CPT | Performed by: EMERGENCY MEDICINE

## 2025-01-21 PROCEDURE — 85025 COMPLETE CBC W/AUTO DIFF WBC: CPT | Performed by: EMERGENCY MEDICINE

## 2025-01-21 PROCEDURE — 99285 EMERGENCY DEPT VISIT HI MDM: CPT | Performed by: EMERGENCY MEDICINE

## 2025-01-21 PROCEDURE — 85610 PROTHROMBIN TIME: CPT | Performed by: EMERGENCY MEDICINE

## 2025-01-21 PROCEDURE — 83735 ASSAY OF MAGNESIUM: CPT | Performed by: EMERGENCY MEDICINE

## 2025-01-21 PROCEDURE — 85730 THROMBOPLASTIN TIME PARTIAL: CPT | Performed by: EMERGENCY MEDICINE

## 2025-01-21 PROCEDURE — 74177 CT ABD & PELVIS W/CONTRAST: CPT

## 2025-01-21 PROCEDURE — 36415 COLL VENOUS BLD VENIPUNCTURE: CPT | Performed by: EMERGENCY MEDICINE

## 2025-01-21 PROCEDURE — 96361 HYDRATE IV INFUSION ADD-ON: CPT

## 2025-01-21 PROCEDURE — 81001 URINALYSIS AUTO W/SCOPE: CPT

## 2025-01-21 RX ORDER — HYOSCYAMINE SULFATE 0.125 MG
0.12 TABLET ORAL EVERY 4 HOURS PRN
Qty: 30 TABLET | Refills: 0 | Status: SHIPPED | OUTPATIENT
Start: 2025-01-21

## 2025-01-21 RX ORDER — ONDANSETRON 2 MG/ML
4 INJECTION INTRAMUSCULAR; INTRAVENOUS ONCE
Status: COMPLETED | OUTPATIENT
Start: 2025-01-21 | End: 2025-01-21

## 2025-01-21 RX ORDER — ONDANSETRON 4 MG/1
4 TABLET, FILM COATED ORAL EVERY 8 HOURS PRN
Qty: 7 TABLET | Refills: 0 | Status: SHIPPED | OUTPATIENT
Start: 2025-01-21

## 2025-01-21 RX ADMIN — ONDANSETRON 4 MG: 2 INJECTION INTRAMUSCULAR; INTRAVENOUS at 19:12

## 2025-01-21 RX ADMIN — IOHEXOL 100 ML: 350 INJECTION, SOLUTION INTRAVENOUS at 20:57

## 2025-01-21 RX ADMIN — SODIUM CHLORIDE 2000 ML: 0.9 INJECTION, SOLUTION INTRAVENOUS at 19:13

## 2025-01-21 NOTE — ED PROVIDER NOTES
Time reflects when diagnosis was documented in both MDM as applicable and the Disposition within this note       Time User Action Codes Description Comment    1/21/2025  9:47 PM Elle Sapp Add [R11.2] Nausea and vomiting           ED Disposition       None          Assessment & Plan       Medical Decision Making      Differential diagnosis includes but not limited to:  Viral etiology, biliary colic, cholecystitis, obstruction, gastritis, diabetic gastroparesis, appendicitis, hepatitis, mi, AFib, torsion, kidney stones, DKA, increased ICP, meningitis, withdrawal from medication, pregnancy, psychogenic, radiation, migraines, labyrinthitis, Meniere's    Will obtain EKG t rule out and assess for STEMI versus arrhythmia versuso interval abnormality versus ischemic changes; troponin to evaluate for ischemia; CBC to assess for leukocytosis or anemia; CMP to assess for LYDIA versus electrolyte abnormalities versus elevated LFTs.  Will also obtain chest and pelvis for further evaluation of intra-abdominal pathology.    Problems Addressed:  Nausea and vomiting: acute illness or injury    Amount and/or Complexity of Data Reviewed  Independent Historian: spouse     Details:     Wife      Labs: ordered. Decision-making details documented in ED Course.     Details:   No anemia, thrombocytopenia leukocytosis  No acute kidney injury electrolyte abnormality  Lipase low  Coags within normal range  Lactic acid within normal range        Radiology: ordered. Decision-making details documented in ED Course.     Details:     CT abdomen pelvis interpreted by radiologist  Mildly dilated appendix up to 9 mm in caliber without appreciable periappendiceal inflammatory changes. Early/mild appendicitis is difficult to entirely exclude.  1.5 cm cystic structure in the right lower lobe, possibly a bronchogenic       Discussion of management or test interpretation with external provider(s): General Surgery     Risk  Prescription drug  "management.        ED Course as of 01/21/25 2211 Tue Jan 21, 2025 1902 Patient is a 34-year-old male here with wife coming in today with nausea vomiting for the past 3 days with decreased p.o. intake.  On exam well-appearing in no acute distress.  Nonseptic.  Nonperitoneal.  Will check labs including lipase and lactate, will give IV fluids, Zofran and CT scan.    Disclosure: Voice to text software was used in the preparation of this document and could have resulted in translational errors.      Occasional wrong word or \"sound a like\" substitutions may have occurred due to the inherent limitations of voice recognition software.  Read the chart carefully and recognize, using context, where substitutions have occurred.       I have independently reviewed external records are available to me to the level of detail possible within the time constraints of my patient care responsibilities in the ED.       2006 Labs reviewed and without actionable derangement    Pending LA       2104 Urine Macroscopic, POC(!)  Ketones in Urine.        2211 Signed out to Dr Saldaña . General Surgery aware of patient.            Medications   sodium chloride 0.9 % bolus 2,000 mL (0 mL Intravenous Stopped 1/21/25 2121)   ondansetron (ZOFRAN) injection 4 mg (4 mg Intravenous Given 1/21/25 1912)   iohexol (OMNIPAQUE) 350 MG/ML injection (MULTI-DOSE) 100 mL (100 mL Intravenous Given 1/21/25 2057)       ED Risk Strat Scores                          SBIRT 22yo+      Flowsheet Row Most Recent Value   Initial Alcohol Screen: US AUDIT-C     1. How often do you have a drink containing alcohol? 0 Filed at: 01/21/2025 1840   2. How many drinks containing alcohol do you have on a typical day you are drinking?  0 Filed at: 01/21/2025 1840   3a. Male UNDER 65: How often do you have five or more drinks on one occasion? 0 Filed at: 01/21/2025 1840   Audit-C Score 0 Filed at: 01/21/2025 1840   LOBO: How many times in the past year have you...    Used an " illegal drug or used a prescription medication for non-medical reasons? Never Filed at: 01/21/2025 1840                            History of Present Illness       Chief Complaint   Patient presents with    Vomiting     Pt reports vomiting since Sunday- has not been able to keep anything down. Denies decreased urine output.        No past medical history on file.   No past surgical history on file.   Family History   Problem Relation Age of Onset    Hypertension Mother     Diabetes Mother     Lung disease Mother     No Known Problems Father     Depression Sister     No Known Problems Sister     No Known Problems Brother     No Known Problems Brother     No Known Problems Brother       Social History     Tobacco Use    Smoking status: Some Days     Types: Cigarettes, Cigars     Start date: 2022     Passive exposure: Current    Smokeless tobacco: Never   Vaping Use    Vaping status: Never Used   Substance Use Topics    Alcohol use: Yes     Comment: SOCIALLY    Drug use: Never      E-Cigarette/Vaping    E-Cigarette Use Never User       E-Cigarette/Vaping Substances    Nicotine No     THC No     CBD No     Flavoring No     Other No     Unknown No       I have reviewed and agree with the history as documented.     Patient is a 34-year-old male here with wife coming in for nausea and vomiting this been ongoing for 3 days.  Patient states that he has no recent travel, sick contacts recent antibiotic use, or recent surgeries.  He reports that every time he goes to eat or drink he becomes nauseated and vomits.  He has decreased p.o. intake as well as decreased urination.  He denies any diarrhea.  He states that he was able to keep down some water mashed potatoes today.      History provided by:  Medical records, patient and spouse   used: No    Vomiting  Severity:  Mild  Duration:  3 days  Timing:  Intermittent  Progression:  Improving  Chronicity:  New  Recent urination:  Decreased  Context: not  post-tussive and not self-induced    Relieved by:  None tried  Worsened by:  Nothing  Ineffective treatments:  None tried  Associated symptoms: no abdominal pain, no arthralgias, no chills, no cough, no diarrhea, no fever, no headaches, no myalgias, no sore throat and no URI    Risk factors: no alcohol use, no diabetes, no prior abdominal surgery, no sick contacts, no suspect food intake and no travel to endemic areas        Review of Systems   Constitutional: Negative.  Negative for chills and fever.   HENT: Negative.  Negative for ear pain and sore throat.    Eyes: Negative.  Negative for pain and visual disturbance.   Respiratory: Negative.  Negative for cough and shortness of breath.    Cardiovascular: Negative.  Negative for chest pain and palpitations.   Gastrointestinal:  Positive for vomiting. Negative for abdominal pain and diarrhea.   Genitourinary: Negative.  Negative for dysuria and hematuria.   Musculoskeletal: Negative.  Negative for arthralgias, back pain and myalgias.   Skin: Negative.  Negative for color change and rash.   Neurological: Negative.  Negative for seizures, syncope and headaches.   Hematological: Negative.    Psychiatric/Behavioral: Negative.     All other systems reviewed and are negative.          Objective       ED Triage Vitals [01/21/25 1837]   Temperature Pulse Blood Pressure Respirations SpO2 Patient Position - Orthostatic VS   98.2 °F (36.8 °C) 71 157/75 16 99 % Lying      Temp Source Heart Rate Source BP Location FiO2 (%) Pain Score    Oral Monitor Right arm -- No Pain      Vitals      Date and Time Temp Pulse SpO2 Resp BP Pain Score FACES Pain Rating User   01/21/25 2030 -- 71 99 % 18 146/70 -- -- MM   01/21/25 1915 -- 75 97 % 16 160/71 -- -- MM   01/21/25 1837 98.2 °F (36.8 °C) 71 99 % 16 157/75 No Pain -- LB            Physical Exam  Vitals and nursing note reviewed.   Constitutional:       General: He is not in acute distress.     Appearance: He is well-developed.   HENT:       Head: Normocephalic and atraumatic.      Right Ear: External ear normal.      Left Ear: External ear normal.      Nose: Nose normal.      Mouth/Throat:      Lips: Pink.      Mouth: Mucous membranes are dry.   Eyes:      General: Lids are normal. Gaze aligned appropriately.      Extraocular Movements: Extraocular movements intact.      Conjunctiva/sclera: Conjunctivae normal.      Pupils: Pupils are equal, round, and reactive to light.   Cardiovascular:      Rate and Rhythm: Normal rate and regular rhythm.      Pulses:           Dorsalis pedis pulses are 2+ on the right side and 2+ on the left side.      Heart sounds: Normal heart sounds, S1 normal and S2 normal. No murmur heard.  Pulmonary:      Effort: Pulmonary effort is normal. No respiratory distress.      Breath sounds: Normal breath sounds.   Abdominal:      General: Bowel sounds are normal.      Palpations: Abdomen is soft.      Tenderness: There is no abdominal tenderness. There is no guarding. Negative signs include Buitrago's sign, Rovsing's sign and McBurney's sign.   Musculoskeletal:         General: No swelling.      Cervical back: Neck supple.      Right lower leg: No edema.      Left lower leg: No edema.   Skin:     General: Skin is warm and dry.      Capillary Refill: Capillary refill takes less than 2 seconds.   Neurological:      General: No focal deficit present.      Mental Status: He is alert and oriented to person, place, and time.      GCS: GCS eye subscore is 4. GCS verbal subscore is 5. GCS motor subscore is 6.      Cranial Nerves: Cranial nerves 2-12 are intact.      Sensory: Sensation is intact.      Motor: Motor function is intact.      Coordination: Coordination is intact.   Psychiatric:         Mood and Affect: Mood normal.         Results Reviewed       Procedure Component Value Units Date/Time    Lactic acid, plasma (w/reflex if result > 2.0) [[id number]]  (Normal) Collected: 01/21/25 2121    Lab Status: Final result Specimen: Blood  from Arm, Right Updated: 01/21/25 2146     LACTIC ACID 0.9 mmol/L     Narrative:      Result may be elevated if tourniquet was used during collection.    Urine Microscopic [[id number]]  (Abnormal) Collected: 01/21/25 2041    Lab Status: Final result Specimen: Urine, Clean Catch Updated: 01/21/25 2122     RBC, UA None Seen /hpf      WBC, UA 1-2 /hpf      Epithelial Cells Occasional /hpf      Bacteria, UA Occasional /hpf      MUCUS THREADS Occasional    Urine Macroscopic, POC [[id number]]  (Abnormal) Collected: 01/21/25 2041    Lab Status: Final result Specimen: Urine Updated: 01/21/25 2042     Color, UA Yellow     Clarity, UA Clear     pH, UA 7.0     Leukocytes, UA Trace     Nitrite, UA Negative     Protein, UA Negative mg/dl      Glucose, UA Negative mg/dl      Ketones, UA 40 (2+) mg/dl      Urobilinogen, UA 0.2 E.U./dl      Bilirubin, UA Negative     Occult Blood, UA Negative     Specific Gravity, UA 1.020    Narrative:      CLINITEK RESULT    Comprehensive metabolic panel [[id number]] Collected: 01/21/25 1907    Lab Status: Final result Specimen: Blood from Arm, Left Updated: 01/21/25 1958     Sodium 135 mmol/L      Potassium 4.2 mmol/L      Chloride 99 mmol/L      CO2 29 mmol/L      ANION GAP 7 mmol/L      BUN 13 mg/dL      Creatinine 0.92 mg/dL      Glucose 86 mg/dL      Calcium 10.0 mg/dL      AST 26 U/L      ALT 35 U/L      Alkaline Phosphatase 77 U/L      Total Protein 8.4 g/dL      Albumin 4.8 g/dL      Total Bilirubin 0.60 mg/dL      eGFR 109 ml/min/1.73sq m     Narrative:      National Kid[employer] Disease Foundation guidelines for Chronic Kidney Disease (CKD):     Stage 1 with normal or high GFR (GFR > 90 mL/min/1.73 square meters)    Stage 2 Mild CKD (GFR = 60-89 mL/min/1.73 square meters)    Stage 3A Moderate CKD (GFR = 45-59 mL/min/1.73 square meters)    Stage 3B Moderate CKD (GFR = 30-44 mL/min/1.73 square meters)    Stage 4 Severe CKD (GFR = 15-29 mL/min/1.73 square meters)    Stage 5 End Stage CKD (GFR <15  mL/min/1.73 square meters)  Note: GFR calculation is accurate only with a steady state creatinine    Lipase [[id number]]  (Abnormal) Collected: 01/21/25 1907    Lab Status: Final result Specimen: Blood from Arm, Left Updated: 01/21/25 1958     Lipase 10 u/L     Magnesium [[id number]]  (Normal) Collected: 01/21/25 1907    Lab Status: Final result Specimen: Blood from Arm, Left Updated: 01/21/25 1958     Magnesium 2.0 mg/dL     Protime-INR [[id number]]  (Normal) Collected: 01/21/25 1907    Lab Status: Final result Specimen: Blood from Arm, Left Updated: 01/21/25 1939     Protime 14.8 seconds      INR 1.14    Narrative:      INR Therapeutic Range    Indication                                             INR Range      Atrial Fibrillation                                               2.0-3.0  Hypercoagulable State                                    2.0.2.3  Left Ventricular Asist Device                            2.0-3.0  Mechanical Heart Valve                                  -    Aortic(with afib, MI, embolism, HF, LA enlargement,    and/or coagulopathy)                                     2.0-3.0 (2.5-3.5)     Mitral                                                             2.5-3.5  Prosthetic/Bioprosthetic Heart Valve               2.0-3.0  Venous thromboembolism (VTE: VT, PE        2.0-3.0    APTT [[id number]]  (Normal) Collected: 01/21/25 1907    Lab Status: Final result Specimen: Blood from Arm, Left Updated: 01/21/25 1939     PTT 30 seconds     CBC and differential [[id number]]  (Abnormal) Collected: 01/21/25 1907    Lab Status: Final result Specimen: Blood from Arm, Left Updated: 01/21/25 1936     WBC 6.83 Thousand/uL      RBC 6.43 Million/uL      Hemoglobin 14.7 g/dL      Hematocrit 47.5 %      MCV 74 fL      MCH 22.9 pg      MCHC 30.9 g/dL      RDW 15.9 %      MPV 12.0 fL      Platelets 150 Thousands/uL      nRBC 0 /100 WBCs      Segmented % 60 %      Immature Grans % 0 %      Lymphocytes % 25 %      Monocytes %  13 %      Eosinophils Relative 2 %      Basophils Relative 0 %      Absolute Neutrophils 4.09 Thousands/µL      Absolute Immature Grans 0.01 Thousand/uL      Absolute Lymphocytes 1.73 Thousands/µL      Absolute Monocytes 0.88 Thousand/µL      Eosinophils Absolute 0.11 Thousand/µL      Basophils Absolute 0.01 Thousands/µL             CT abdomen pelvis with contrast    (Results Pending)       Procedures    ED Medication and Procedure Management   Prior to Admission Medications   Prescriptions Last Dose Informant Patient Reported? Taking?   cetirizine (ZyrTEC) 10 mg tablet   No No   Sig: Take 1 tablet (10 mg total) by mouth daily   ketoconazole (NIZORAL) 2 % shampoo   No No   Sig: Apply 1 Application topically 2 (two) times a week      Facility-Administered Medications: None     Patient's Medications   Discharge Prescriptions    No medications on file     No discharge procedures on file.  ED SEPSIS DOCUMENTATION   Time reflects when diagnosis was documented in both MDM as applicable and the Disposition within this note       Time User Action Codes Description Comment    1/21/2025  9:47 PM Elle Sapp Add [R11.2] Nausea and vomiting                  Elle Sapp DO  01/22/25 1432

## 2025-01-21 NOTE — Clinical Note
Paolo Monge was seen and treated in our emergency department on 1/21/2025.    No restrictions            Diagnosis:     Paolo  may return to work on return date.    He may return on this date: 01/23/2025         If you have any questions or concerns, please don't hesitate to call.      Elle Sapp, DO    ______________________________           _______________          _______________  Hospital Representative                              Date                                Time

## 2025-01-22 NOTE — ASSESSMENT & PLAN NOTE
34 year old male with no past medical or surgical history presenting with x 3 days of nausea without any other associated symptoms or complaints. Patient has benign abdomen; soft, non-distended, negative McBurney's and no RLQ pain. CT showing 9mm. appendix caliber without surrounding inflammation or fluid collection. Labs all within normal limits. With all the above factors, likely vomiting and nausea attributed towards something of viral etiology    Plan:  - Continue to hydrate with fluids and electrolytes  - Strict return precautions if patient begins to develop RLQ pain, worsening of symptoms, or fevers/chills  - No surgical intervention indicated at this time due to the above factors   - Remainder of care per ED

## 2025-01-22 NOTE — ED CARE HANDOFF
Emergency Department Sign Out Note        Sign out and transfer of 3care from Dr. Sapp. See Separate Emergency Department note.     The patient, Paolo Monge, was evaluated by the previous provider for acute abdominal pain.    Workup Completed:  Labs, ct a/p    ED Course / Workup Pending (followup):  General surgery consult                                     Procedures  Medical Decision Making  Pt seen and evaluated be meghna calero pa-c of general surgery.  They do not feel this to be 2/2 acute appendicitis and believe it to be likely viral in etiology.  They have given him strict return precautions which he understands, will dc to home as per surgery recommendation w/ return precautions.     Amount and/or Complexity of Data Reviewed  Labs: ordered.  Radiology: ordered.    Risk  Prescription drug management.            Disposition  Final diagnoses:   Nausea and vomiting   Acute abdominal pain     Time reflects when diagnosis was documented in both MDM as applicable and the Disposition within this note       Time User Action Codes Description Comment    1/21/2025  9:47 PM Elle Sapp Add [R11.2] Nausea and vomiting     1/21/2025 11:20 PM Tommie Saldaña Add [R10.9] Acute abdominal pain           ED Disposition       None          Follow-up Information    None       Patient's Medications   Discharge Prescriptions    No medications on file     No discharge procedures on file.       ED Provider  Electronically Signed by     Tommie Saldaña MD  01/21/25 9434

## 2025-01-22 NOTE — CONSULTS
Consultation - Surgery-General   Name: Paolo Monge 34 y.o. male I MRN: [id number]  Unit/Bed#: ED-06 I Date of Admission: 1/21/2025   Date of Service: 1/21/2025 I Hospital Day: 0   Inpatient consult to Acute Care Surgery  Consult performed by: Mao Deutsch PA-C  Consult ordered by: Tommie Saldaña MD        Physician Requesting Evaluation: Elle Sapp DO   Reason for Evaluation / Principal Problem: Nausea and vomiting     Assessment & Plan  Nausea & vomiting  34 year old male with no past medical or surgical history presenting with x 3 days of nausea without any other associated symptoms or complaints. Patient has benign abdomen; soft, non-distended, negative McBurney's and no RLQ pain. CT showing 9mm. appendix caliber without surrounding inflammation or fluid collection. Labs all within normal limits. With all the above factors, likely vomiting and nausea attributed towards something of viral etiology    Plan:  - Continue to hydrate with fluids and electrolytes  - Strict return precautions if patient begins to develop RLQ pain, worsening of symptoms, or fevers/chills  - No surgical intervention indicated at this time due to the above factors   - Remainder of care per ED  Please contact the SecureChat role for the Surgery-General service with any questions/concerns.    History of Present Illness   Paolo Monge is a 34 y.o. male with no past medical or surgical history presents with x 3 days of nausea and associated vomiting. Patient states nausea began on Sunday with progression with vomiting. He denies any abdominal pain at all and continues to have bowel movements that are normal in caliber and frequency. He denies any fevers, chills, SOB, or chest pain at this time. Patient has never had symptoms before or RLQ pain.    Review of Systems   Constitutional:  Negative for activity change, chills, fatigue, fever and unexpected weight change.   Respiratory:  Negative for apnea and shortness of breath.     Gastrointestinal:  Positive for nausea and vomiting. Negative for abdominal pain, constipation and diarrhea.   Genitourinary:  Negative for difficulty urinating, dysuria and flank pain.   Neurological:  Negative for dizziness, numbness and headaches.     I have reviewed the patient's PMH, PSH, Social History, Family History, Meds, and Allergies  Historical Information   No past medical history on file.  No past surgical history on file.  Social History     Tobacco Use    Smoking status: Some Days     Types: Cigarettes, Cigars     Start date: 2022     Passive exposure: Current    Smokeless tobacco: Never   Vaping Use    Vaping status: Never Used   Substance and Sexual Activity    Alcohol use: Yes     Comment: SOCIALLY    Drug use: Never    Sexual activity: Yes     Partners: Female     E-Cigarette/Vaping    E-Cigarette Use Never User      E-Cigarette/Vaping Substances    Nicotine No     THC No     CBD No     Flavoring No     Other No     Unknown No      Family History   Problem Relation Age of Onset    Hypertension Mother     Diabetes Mother     Lung disease Mother     No Known Problems Father     Depression Sister     No Known Problems Sister     No Known Problems Brother     No Known Problems Brother     No Known Problems Brother      Social History     Tobacco Use    Smoking status: Some Days     Types: Cigarettes, Cigars     Start date: 2022     Passive exposure: Current    Smokeless tobacco: Never   Vaping Use    Vaping status: Never Used   Substance and Sexual Activity    Alcohol use: Yes     Comment: SOCIALLY    Drug use: Never    Sexual activity: Yes     Partners: Female     No current facility-administered medications for this encounter.  Prior to Admission Medications   Prescriptions Last Dose Informant Patient Reported? Taking?   cetirizine (ZyrTEC) 10 mg tablet Not Taking  No No   Sig: Take 1 tablet (10 mg total) by mouth daily   Patient not taking: Reported on 1/21/2025   ketoconazole (NIZORAL) 2 %  "shampoo Not Taking  No No   Sig: Apply 1 Application topically 2 (two) times a week   Patient not taking: Reported on 1/21/2025      Facility-Administered Medications: None     Patient has no known allergies.    Objective :  Temp:  [98.2 °F (36.8 °C)] 98.2 °F (36.8 °C)  HR:  [71-86] 86  BP: (146-160)/(70-79) 147/79  Resp:  [16-18] 16  SpO2:  [97 %-99 %] 98 %  O2 Device: None (Room air)      Physical Exam  Constitutional:       General: He is not in acute distress.     Appearance: Normal appearance. He is not ill-appearing, toxic-appearing or diaphoretic.   Cardiovascular:      Rate and Rhythm: Normal rate and regular rhythm.      Pulses: Normal pulses.      Heart sounds: Normal heart sounds. No murmur heard.  Pulmonary:      Effort: Pulmonary effort is normal. No respiratory distress.      Breath sounds: Normal breath sounds.   Abdominal:      General: Abdomen is flat. Bowel sounds are normal. There is no distension.      Palpations: Abdomen is soft. There is no mass.      Tenderness: There is no abdominal tenderness. There is no right CVA tenderness, left CVA tenderness, guarding or rebound.      Hernia: No hernia is present.   Skin:     General: Skin is warm and dry.   Neurological:      General: No focal deficit present.      Mental Status: He is alert and oriented to person, place, and time.       Lab Results: I have reviewed the following results:  Recent Labs     01/21/25  1907 01/21/25  2121   WBC 6.83  --    HGB 14.7  --    HCT 47.5  --      --    SODIUM 135  --    K 4.2  --    CL 99  --    CO2 29  --    BUN 13  --    CREATININE 0.92  --    GLUC 86  --    MG 2.0  --    AST 26  --    ALT 35  --    ALB 4.8  --    TBILI 0.60  --    ALKPHOS 77  --    PTT 30  --    INR 1.14  --    LACTICACID  --  0.9     1/21 CT: \"Mildly dilated appendix up to 9 mm in caliber without appreciable periappendiceal inflammatory changes. Early/mild appendicitis is difficult to entirely exclude\"      VTE Pharmacologic " Prophylaxis: VTE covered by:    None     VTE Mechanical Prophylaxis: sequential compression device    Administrative Statements

## 2025-01-27 ENCOUNTER — TELEPHONE (OUTPATIENT)
Age: 35
End: 2025-01-27

## 2025-01-27 NOTE — TELEPHONE ENCOUNTER
Pt wife called in to check on the FMLA paper work faxed on Friday to the practice for Cathryn completion. Did check on the chart no update. Kindly call back patient wife Boy @ [phone] to confirm on the same as the patient do not speak English. Thanks

## 2025-02-18 ENCOUNTER — TELEPHONE (OUTPATIENT)
Age: 35
End: 2025-02-18

## 2025-02-18 NOTE — TELEPHONE ENCOUNTER
Patient's spouse Jena called to make Annual Physical appointment for patient after 4:45 pm due to his work. I cancelled appointment on 4/18/2025 and offered to make a new appointment. She declined and will have the patient call back to reschedule.

## 2025-05-21 ENCOUNTER — OFFICE VISIT (OUTPATIENT)
Dept: FAMILY MEDICINE CLINIC | Facility: CLINIC | Age: 35
End: 2025-05-21
Payer: COMMERCIAL

## 2025-05-21 VITALS
TEMPERATURE: 98.1 F | DIASTOLIC BLOOD PRESSURE: 84 MMHG | OXYGEN SATURATION: 97 % | HEIGHT: 73 IN | BODY MASS INDEX: 36.13 KG/M2 | WEIGHT: 272.6 LBS | SYSTOLIC BLOOD PRESSURE: 134 MMHG | HEART RATE: 77 BPM

## 2025-05-21 DIAGNOSIS — B35.3 TINEA PEDIS OF RIGHT FOOT: ICD-10-CM

## 2025-05-21 DIAGNOSIS — L21.0 DANDRUFF IN ADULT: ICD-10-CM

## 2025-05-21 DIAGNOSIS — L84 CORN OF FOOT: ICD-10-CM

## 2025-05-21 DIAGNOSIS — B37.9 CANDIDIASIS: ICD-10-CM

## 2025-05-21 DIAGNOSIS — L91.8 SKIN TAG: ICD-10-CM

## 2025-05-21 DIAGNOSIS — B07.0 PLANTAR WART OF LEFT FOOT: ICD-10-CM

## 2025-05-21 DIAGNOSIS — Z13.6 SCREENING FOR CARDIOVASCULAR CONDITION: ICD-10-CM

## 2025-05-21 DIAGNOSIS — Z13.1 SCREENING FOR DIABETES MELLITUS: ICD-10-CM

## 2025-05-21 DIAGNOSIS — Z00.00 ANNUAL PHYSICAL EXAM: Primary | ICD-10-CM

## 2025-05-21 PROCEDURE — 17110 DESTRUCTION B9 LES UP TO 14: CPT

## 2025-05-21 PROCEDURE — 99395 PREV VISIT EST AGE 18-39: CPT

## 2025-05-21 PROCEDURE — 99214 OFFICE O/P EST MOD 30 MIN: CPT

## 2025-05-21 RX ORDER — NYSTATIN 100000 [USP'U]/G
POWDER TOPICAL 2 TIMES DAILY
Qty: 15 G | Refills: 0 | Status: SHIPPED | OUTPATIENT
Start: 2025-05-21

## 2025-05-21 RX ORDER — PRENATAL VIT 91/IRON/FOLIC/DHA 28-975-200
COMBINATION PACKAGE (EA) ORAL 2 TIMES DAILY
Qty: 12 G | Refills: 0 | Status: SHIPPED | OUTPATIENT
Start: 2025-05-21

## 2025-05-21 NOTE — PROGRESS NOTES
Adult Annual Physical  Name: Paolo Monge      : 1990      MRN: [id number]  Encounter Provider: TARA Keith  Encounter Date: 2025   Encounter department: [facility]    :  Assessment & Plan  Annual physical exam    Orders:    CBC and differential; Future    Lipid panel; Future    TSH, 3rd generation with Free T4 reflex; Future    Comprehensive metabolic panel; Future    Hemoglobin A1C; Future    Candidiasis  Groin   Orders:    nystatin (MYCOSTATIN) powder; Apply topically 2 (two) times a day    Tinea pedis of right foot  Right big toe   Terbinafine cream ordered  Orders:    terbinafine (LamISIL) 1 % cream; Apply topically 2 (two) times a day    Corn of foot  Corn of right fifth toe    Referral to podiatry placed  Orders:    Ambulatory Referral to Podiatry; Future    BMI 33.0-33.9,adult  Wt Readings from Last 3 Encounters:   25 124 kg (272 lb 9.6 oz)   25 120 kg (265 lb 3.4 oz)   24 114 kg (251 lb 9.6 oz)     General Recommendations:  Nutrition:  Eat breakfast daily.  Do not skip meals.    Food log (ie.) www.[employer]com, sparkpeople.com, loseit.com, calorieking.com, etc.  Practice mindful eating.  Be sure to set aside time to eat, eat slowly, and savor your food.  Hydration:    At least 64oz of water daily.  No sugar sweetened beverages.  No juice (eat the fruit instead).  Exercise:  Studies have shown that the ideal exercise goal is somewhere between 150 to 300 minutes of moderate intensity exercise a week.  Start with exercising 10 minutes every other day and gradually increase physical activity with a goal of at least 150 minutes of moderate intensity exercise a week, divided over at least 3 days a week.  An example of this would be exercising 30 minutes a day, 5 days a week.  Resistance training can increase muscle mass and increase our resting metabolic rate.   FULL BODY resistance training is recommended 2-3 times a week.  Do not do  this on consecutive days to allow for muscle recovery.   Aim for a bare minimum 5000 steps, even on days you do not exercise.  Monitoring:   Weigh yourself daily.  If this causes undue stress, then just weigh yourself once a week.  Weigh yourself the same time of the day with the same amount of clothing on.  Preferably this should be done after waking up, before you eat, and with no clothing or minimal clothing on.    Orders:    Ambulatory Referral to Sleep Medicine; Future    Screening for diabetes mellitus         Plantar wart of left foot         Screening for cardiovascular condition         Dandruff in adult  Dry scalp   Has tried head and shoulders with no relief   Prescription grade shampoo sent to pharmacy     Orders:    Ambulatory Referral to Dermatology; Future    Skin tag             Preventive Screenings:  - Diabetes Screening: screening up-to-date  - Cholesterol Screening: risks/benefits discussed   - Hepatitis C screening: screening up-to-date   - HIV screening: screening up-to-date   - Colon cancer screening: risks/benefits discussed   - Lung cancer screening: screening not indicated   - Prostate cancer screening: screening not indicated     Immunizations:  - Immunizations due: Prevnar 20 and Tdap    Counseling/Anticipatory Guidance:  - Alcohol: discussed moderation in alcohol intake and recommendations for healthy alcohol use.   - Drug use: discussed harms of illicit drug use and how it can negatively impact mental/physical health.   - Tobacco use: discussed harms of tobacco use and management options for quitting.   - Dental health: discussed importance of regular tooth brushing, flossing, and dental visits.   - Sexual health: discussed sexually transmitted diseases, partner selection, use of condoms, avoidance of unintended pregnancy, and contraceptive alternatives.   - Diet: discussed recommendations for a healthy/well-balanced diet.   - Exercise: the importance of regular exercise/physical  activity was discussed. Recommend exercise 3-5 times per week for at least 30 minutes.   - Injury prevention: discussed safety/seat belts, safety helmets, smoke detectors, carbon monoxide detectors, and smoking near bedding or upholstery.       Depression Screening and Follow-up Plan: Patient was screened for depression during today's encounter. They screened negative with a PHQ-2 score of 0.          History of Present Illness     Adult Annual Physical:  Patient presents for annual physical.     Diet and Physical Activity:  - Diet/Nutrition: no special diet.  - Exercise: no formal exercise.    Depression Screening:  - PHQ-2 Score: 0    General Health:  - Sleep: sleeps poorly and 7-8 hours of sleep on average.  - Hearing: normal hearing right ear and normal hearing left ear.  - Vision: no vision problems.  - Dental: regular dental visits, brushes teeth twice daily and floss regularly.     Health:  - History of STDs: no.   - Urinary symptoms: none.     Advanced Care Planning:  - Has an advanced directive?: no    - Has a durable medical POA?: no    - ACP document given to patient?: no      Review of Systems   Constitutional:  Negative for activity change, chills, fatigue and fever.   HENT:  Negative for congestion, ear pain, rhinorrhea and sore throat.    Eyes:  Negative for pain and visual disturbance.   Respiratory:  Negative for cough, chest tightness and shortness of breath.    Cardiovascular:  Negative for chest pain, palpitations and leg swelling.   Gastrointestinal:  Negative for abdominal pain, constipation, diarrhea, nausea and vomiting.   Genitourinary:  Negative for decreased urine volume, dysuria, frequency, hematuria and urgency.   Musculoskeletal:  Negative for arthralgias and back pain.   Skin:  Negative for color change and rash.   Neurological:  Negative for seizures, syncope and headaches.   Psychiatric/Behavioral:  Negative for dysphoric mood. The patient is not nervous/anxious.    All other  "systems reviewed and are negative.    Medical History Reviewed by provider this encounter:  Tobacco  Allergies  Meds  Problems  Med Hx  Surg Hx  Fam Hx     .    Objective   /84 (BP Location: Left arm, Patient Position: Sitting, Cuff Size: Large)   Pulse 77   Temp 98.1 °F (36.7 °C)   Ht 6' 0.5\" (1.842 m)   Wt 124 kg (272 lb 9.6 oz)   SpO2 97%   BMI 36.46 kg/m²     Physical Exam  Vitals and nursing note reviewed.   Constitutional:       Appearance: Normal appearance. He is well-developed.   HENT:      Head: Normocephalic and atraumatic.      Right Ear: Tympanic membrane, ear canal and external ear normal. There is no impacted cerumen.      Left Ear: Tympanic membrane, ear canal and external ear normal. There is no impacted cerumen.      Nose: Nose normal.      Mouth/Throat:      Mouth: Mucous membranes are moist.      Pharynx: Oropharynx is clear.     Eyes:      Extraocular Movements: Extraocular movements intact.      Conjunctiva/sclera: Conjunctivae normal.      Pupils: Pupils are equal, round, and reactive to light.       Cardiovascular:      Rate and Rhythm: Normal rate and regular rhythm.      Pulses: Normal pulses.      Heart sounds: Normal heart sounds. No murmur heard.  Pulmonary:      Effort: Pulmonary effort is normal. No respiratory distress.      Breath sounds: Normal breath sounds.   Abdominal:      General: Bowel sounds are normal.      Palpations: Abdomen is soft.      Tenderness: There is no abdominal tenderness.     Musculoskeletal:         General: No swelling. Normal range of motion.      Cervical back: Normal range of motion and neck supple.      Right lower leg: No edema.      Left lower leg: No edema.     Skin:     General: Skin is warm and dry.      Capillary Refill: Capillary refill takes less than 2 seconds.     Neurological:      General: No focal deficit present.      Mental Status: He is alert and oriented to person, place, and time. Mental status is at baseline. "     Psychiatric:         Mood and Affect: Mood normal.         Behavior: Behavior normal.         Thought Content: Thought content normal.         Judgment: Judgment normal.       Administrative Statements   I have spent a total time of 40 minutes in caring for this patient on the day of the visit/encounter including Diagnostic results, Prognosis, Risks and benefits of tx options, Instructions for management, Patient and family education, Importance of tx compliance, Risk factor reductions, Impressions, Counseling / Coordination of care, Documenting in the medical record, Reviewing/placing orders in the medical record (including tests, medications, and/or procedures), and Obtaining or reviewing history  .    Lesion Destruction    Date/Time: 5/21/2025 6:00 PM    Performed by: TARA Keith  Authorized by: TARA Keith    Universal Protocol:  Procedure performed by:  Consent: Verbal consent obtained  Risks and benefits: risks, benefits and alternatives were discussed  Consent given by: patient  Timeout called at: 5/21/2025 6:10 PM.  Patient understanding: patient states understanding of the procedure being performed  Required items: required blood products, implants, devices, and special equipment available  Patient identity confirmed: verbally with patient    Procedure Details - Lesion Destruction:     Number of Lesions:  6  Lesion 1:     Body area:  Lower extremity    Lower extremity location:  L foot    Malignancy: benign lesion      Destruction method: cryotherapy    Lesion 2:     Body area:  Upper extremity    Upper extremity location:  R upper arm    Malignancy: benign lesion      Destruction method: cryotherapy      Cosmetic?: Yes    Lesion 3:     Body area:  Upper extremity    Upper extremity location:  R upper arm    Malignancy: benign lesion      Destruction method: cryotherapy      Cosmetic?: Yes    Lesion 4:     Body area:  Upper extremity    Upper extremity location:  R upper arm     "Malignancy: benign lesion      Destruction method: cryotherapy      Cosmetic?: Yes    Lesion 5:     Body area:  Upper extremity    Upper extremity location:  R upper arm    Malignancy: benign lesion      Destruction method: cryotherapy      Cosmetic?: Yes    Lesion 6:     Body area:  Upper extremity    Upper extremity location:  L upper arm    Malignancy: benign lesion      Destruction method: cryotherapy      Cosmetic?: Yes       Lesion 7 Body Area: L upper arm   Malignancy type: Benign lesion   Destruction Method: Cryotherapy   Cosmetic: Yes       Lesion 8  Body Area: L upper arm   Malignancy type: Benign lesion   Destruction Method: Cryotherapy   Cosmetic: Yes        Lesion 9  Body Area: L upper arm   Malignancy type: Benign lesion   Destruction Method: Cryotherapy   Cosmetic: Yes     Patient Instructions   Patient Education     Routine physical for adults   The Basics   Written by the doctors and editors at [facility]   What is a physical? -- A physical is a routine visit, or \"check-up,\" with your doctor. You might also hear it called a \"wellness visit\" or \"preventive visit.\"  During each visit, the doctor will:   Ask about your physical and mental health   Ask about your habits, behaviors, and lifestyle   Do an exam   Give you vaccines if needed   Talk to you about any medicines you take   Give advice about your health   Answer your questions  Getting regular check-ups is an important part of taking care of your health. It can help your doctor find and treat any problems you have. But it's also important for preventing health problems.  A routine physical is different from a \"sick visit.\" A sick visit is when you see a doctor because of a health concern or problem. Since physicals are scheduled ahead of time, you can think about what you want to ask the doctor.  How often should I get a physical? -- It depends on your age and health. In general, for people age 21 years and older:   If you are younger than 50 " "years, you might be able to get a physical every 3 years.   If you are 50 years or older, your doctor might recommend a physical every year.  If you have an ongoing health condition, like diabetes or high blood pressure, your doctor will probably want to see you more often.  What happens during a physical? -- In general, each visit will include:   Physical exam - The doctor or nurse will check your height, weight, heart rate, and blood pressure. They will also look at your eyes and ears. They will ask about how you are feeling and whether you have any symptoms that bother you.   Medicines - It's a good idea to bring a list of all the medicines you take to each doctor visit. Your doctor will talk to you about your medicines and answer any questions. Tell them if you are having any side effects that bother you. You should also tell them if you are having trouble paying for any of your medicines.   Habits and behaviors - This includes:   Your diet   Your exercise habits   Whether you smoke, drink alcohol, or use drugs   Whether you are sexually active   Whether you feel safe at home  Your doctor will talk to you about things you can do to improve your health and lower your risk of health problems. They will also offer help and support. For example, if you want to quit smoking, they can give you advice and might prescribe medicines. If you want to improve your diet or get more physical activity, they can help you with this, too.   Lab tests, if needed - The tests you get will depend on your age and situation. For example, your doctor might want to check your:   Cholesterol   Blood sugar   Iron level   Vaccines - The recommended vaccines will depend on your age, health, and what vaccines you already had. Vaccines are very important because they can prevent certain serious or deadly infections.   Discussion of screening - \"Screening\" means checking for diseases or other health problems before they cause symptoms. Your " doctor can recommend screening based on your age, risk, and preferences. This might include tests to check for:   Cancer, such as breast, prostate, cervical, ovarian, colorectal, prostate, lung, or skin cancer   Sexually transmitted infections, such as chlamydia and gonorrhea   Mental health conditions like depression and anxiety  Your doctor will talk to you about the different types of screening tests. They can help you decide which screenings to have. They can also explain what the results might mean.   Answering questions - The physical is a good time to ask the doctor or nurse questions about your health. If needed, they can refer you to other doctors or specialists, too.  Adults older than 65 years often need other care, too. As you get older, your doctor will talk to you about:   How to prevent falling at home   Hearing or vision tests   Memory testing   How to take your medicines safely   Making sure that you have the help and support you need at home  All topics are updated as new evidence becomes available and our peer review process is complete.  This topic retrieved from [employer] on: May 02, 2024.  Topic 881565 Version 1.0  Release: 32.4.3 - C32.122  © 2024 UpToDate, Inc. and/or its affiliates. All rights reserved.  Consumer Information Use and Disclaimer   Disclaimer: This generalized information is a limited summary of diagnosis, treatment, and/or medication information. It is not meant to be comprehensive and should be used as a tool to help the user understand and/or assess potential diagnostic and treatment options. It does NOT include all information about conditions, treatments, medications, side effects, or risks that may apply to a specific patient. It is not intended to be medical advice or a substitute for the medical advice, diagnosis, or treatment of a health care provider based on the health care provider's examination and assessment of a patient's specific and unique circumstances.  Patients must speak with a health care provider for complete information about their health, medical questions, and treatment options, including any risks or benefits regarding use of medications. This information does not endorse any treatments or medications as safe, effective, or approved for treating a specific patient. UpToDate, Inc. and its affiliates disclaim any warranty or liability relating to this information or the use thereof.The use of this information is governed by the Terms of Use, available at https://www.wolters[employer]is[employer]Iuwer.com/en/know/clinical-effectiveness-terms. 2024© UpToDate, Inc. and its affiliates and/or licensors. All rights reserved.  Copyright   © 2024 UpToDate, Inc. and/or its affiliates. All rights reserved.

## 2025-05-21 NOTE — PATIENT INSTRUCTIONS
"Patient Education     Routine physical for adults   The Basics   Written by the doctors and editors at [facility]   What is a physical? -- A physical is a routine visit, or \"check-up,\" with your doctor. You might also hear it called a \"wellness visit\" or \"preventive visit.\"  During each visit, the doctor will:   Ask about your physical and mental health   Ask about your habits, behaviors, and lifestyle   Do an exam   Give you vaccines if needed   Talk to you about any medicines you take   Give advice about your health   Answer your questions  Getting regular check-ups is an important part of taking care of your health. It can help your doctor find and treat any problems you have. But it's also important for preventing health problems.  A routine physical is different from a \"sick visit.\" A sick visit is when you see a doctor because of a health concern or problem. Since physicals are scheduled ahead of time, you can think about what you want to ask the doctor.  How often should I get a physical? -- It depends on your age and health. In general, for people age 21 years and older:   If you are younger than 50 years, you might be able to get a physical every 3 years.   If you are 50 years or older, your doctor might recommend a physical every year.  If you have an ongoing health condition, like diabetes or high blood pressure, your doctor will probably want to see you more often.  What happens during a physical? -- In general, each visit will include:   Physical exam - The doctor or nurse will check your height, weight, heart rate, and blood pressure. They will also look at your eyes and ears. They will ask about how you are feeling and whether you have any symptoms that bother you.   Medicines - It's a good idea to bring a list of all the medicines you take to each doctor visit. Your doctor will talk to you about your medicines and answer any questions. Tell them if you are having any side effects that bother you. You " "should also tell them if you are having trouble paying for any of your medicines.   Habits and behaviors - This includes:   Your diet   Your exercise habits   Whether you smoke, drink alcohol, or use drugs   Whether you are sexually active   Whether you feel safe at home  Your doctor will talk to you about things you can do to improve your health and lower your risk of health problems. They will also offer help and support. For example, if you want to quit smoking, they can give you advice and might prescribe medicines. If you want to improve your diet or get more physical activity, they can help you with this, too.   Lab tests, if needed - The tests you get will depend on your age and situation. For example, your doctor might want to check your:   Cholesterol   Blood sugar   Iron level   Vaccines - The recommended vaccines will depend on your age, health, and what vaccines you already had. Vaccines are very important because they can prevent certain serious or deadly infections.   Discussion of screening - \"Screening\" means checking for diseases or other health problems before they cause symptoms. Your doctor can recommend screening based on your age, risk, and preferences. This might include tests to check for:   Cancer, such as breast, prostate, cervical, ovarian, colorectal, prostate, lung, or skin cancer   Sexually transmitted infections, such as chlamydia and gonorrhea   Mental health conditions like depression and anxiety  Your doctor will talk to you about the different types of screening tests. They can help you decide which screenings to have. They can also explain what the results might mean.   Answering questions - The physical is a good time to ask the doctor or nurse questions about your health. If needed, they can refer you to other doctors or specialists, too.  Adults older than 65 years often need other care, too. As you get older, your doctor will talk to you about:   How to prevent falling at " home   Hearing or vision tests   Memory testing   How to take your medicines safely   Making sure that you have the help and support you need at home  All topics are updated as new evidence becomes available and our peer review process is complete.  This topic retrieved from [employer] on: May 02, 2024.  Topic 137778 Version 1.0  Release: 32.4.3 - C32.122  © 2024 UpToDate, Inc. and/or its affiliates. All rights reserved.  Consumer Information Use and Disclaimer   Disclaimer: This generalized information is a limited summary of diagnosis, treatment, and/or medication information. It is not meant to be comprehensive and should be used as a tool to help the user understand and/or assess potential diagnostic and treatment options. It does NOT include all information about conditions, treatments, medications, side effects, or risks that may apply to a specific patient. It is not intended to be medical advice or a substitute for the medical advice, diagnosis, or treatment of a health care provider based on the health care provider's examination and assessment of a patient's specific and unique circumstances. Patients must speak with a health care provider for complete information about their health, medical questions, and treatment options, including any risks or benefits regarding use of medications. This information does not endorse any treatments or medications as safe, effective, or approved for treating a specific patient. UpToDate, Inc. and its affiliates disclaim any warranty or liability relating to this information or the use thereof.The use of this information is governed by the Terms of Use, available at https://www.[employer]systemsuwer.com/en/know/clinical-effectiveness-terms. 2024© UpToDate, Inc. and its affiliates and/or licensors. All rights reserved.  Copyright   © 2024 UpToDate, Inc. and/or its affiliates. All rights reserved.

## 2025-05-21 NOTE — ASSESSMENT & PLAN NOTE
Wt Readings from Last 3 Encounters:   05/21/25 124 kg (272 lb 9.6 oz)   01/21/25 120 kg (265 lb 3.4 oz)   04/17/24 114 kg (251 lb 9.6 oz)     General Recommendations:  Nutrition:  Eat breakfast daily.  Do not skip meals.    Food log (ie.) www.[employer].com, sparkpeople.com, loseit.com, Timely.com, etc.  Practice mindful eating.  Be sure to set aside time to eat, eat slowly, and savor your food.  Hydration:    At least 64oz of water daily.  No sugar sweetened beverages.  No juice (eat the fruit instead).  Exercise:  Studies have shown that the ideal exercise goal is somewhere between 150 to 300 minutes of moderate intensity exercise a week.  Start with exercising 10 minutes every other day and gradually increase physical activity with a goal of at least 150 minutes of moderate intensity exercise a week, divided over at least 3 days a week.  An example of this would be exercising 30 minutes a day, 5 days a week.  Resistance training can increase muscle mass and increase our resting metabolic rate.   FULL BODY resistance training is recommended 2-3 times a week.  Do not do this on consecutive days to allow for muscle recovery.   Aim for a bare minimum 5000 steps, even on days you do not exercise.  Monitoring:   Weigh yourself daily.  If this causes undue stress, then just weigh yourself once a week.  Weigh yourself the same time of the day with the same amount of clothing on.  Preferably this should be done after waking up, before you eat, and with no clothing or minimal clothing on.    Orders:    Ambulatory Referral to Sleep Medicine; Future

## 2025-05-22 PROBLEM — B37.9 CANDIDIASIS: Status: ACTIVE | Noted: 2025-05-22

## 2025-05-22 NOTE — ASSESSMENT & PLAN NOTE
Dry scalp   Has tried head and shoulders with no relief   Prescription grade shampoo sent to pharmacy     Orders:    Ambulatory Referral to Dermatology; Future

## 2025-05-24 DIAGNOSIS — B37.9 CANDIDIASIS: ICD-10-CM

## 2025-05-25 RX ORDER — NYSTATIN 100000 [USP'U]/G
POWDER TOPICAL 2 TIMES DAILY
Qty: 15 G | Refills: 0 | OUTPATIENT
Start: 2025-05-25

## 2025-06-09 ENCOUNTER — APPOINTMENT (OUTPATIENT)
Dept: LAB | Facility: CLINIC | Age: 35
End: 2025-06-09
Payer: COMMERCIAL

## 2025-06-09 DIAGNOSIS — Z00.00 ANNUAL PHYSICAL EXAM: ICD-10-CM

## 2025-06-09 LAB
ALBUMIN SERPL BCG-MCNC: 4.4 G/DL (ref 3.5–5)
ALP SERPL-CCNC: 70 U/L (ref 34–104)
ALT SERPL W P-5'-P-CCNC: 34 U/L (ref 7–52)
ANION GAP SERPL CALCULATED.3IONS-SCNC: 6 MMOL/L (ref 4–13)
AST SERPL W P-5'-P-CCNC: 20 U/L (ref 13–39)
BASOPHILS # BLD AUTO: 0.02 THOUSANDS/ÂΜL (ref 0–0.1)
BASOPHILS NFR BLD AUTO: 0 % (ref 0–1)
BILIRUB SERPL-MCNC: 0.39 MG/DL (ref 0.2–1)
BUN SERPL-MCNC: 13 MG/DL (ref 5–25)
CALCIUM SERPL-MCNC: 9 MG/DL (ref 8.4–10.2)
CHLORIDE SERPL-SCNC: 104 MMOL/L (ref 96–108)
CHOLEST SERPL-MCNC: 156 MG/DL (ref ?–200)
CO2 SERPL-SCNC: 30 MMOL/L (ref 21–32)
CREAT SERPL-MCNC: 0.75 MG/DL (ref 0.6–1.3)
EOSINOPHIL # BLD AUTO: 0.3 THOUSAND/ÂΜL (ref 0–0.61)
EOSINOPHIL NFR BLD AUTO: 6 % (ref 0–6)
ERYTHROCYTE [DISTWIDTH] IN BLOOD BY AUTOMATED COUNT: 15 % (ref 11.6–15.1)
EST. AVERAGE GLUCOSE BLD GHB EST-MCNC: 137 MG/DL
GFR SERPL CREATININE-BSD FRML MDRD: 121 ML/MIN/1.73SQ M
GLUCOSE P FAST SERPL-MCNC: 99 MG/DL (ref 65–99)
HBA1C MFR BLD: 6.4 %
HCT VFR BLD AUTO: 41.5 % (ref 36.5–49.3)
HDLC SERPL-MCNC: 58 MG/DL
HGB BLD-MCNC: 13 G/DL (ref 12–17)
IMM GRANULOCYTES # BLD AUTO: 0.01 THOUSAND/UL (ref 0–0.2)
IMM GRANULOCYTES NFR BLD AUTO: 0 % (ref 0–2)
LDLC SERPL CALC-MCNC: 88 MG/DL (ref 0–100)
LYMPHOCYTES # BLD AUTO: 1.99 THOUSANDS/ÂΜL (ref 0.6–4.47)
LYMPHOCYTES NFR BLD AUTO: 42 % (ref 14–44)
MCH RBC QN AUTO: 23.6 PG (ref 26.8–34.3)
MCHC RBC AUTO-ENTMCNC: 31.3 G/DL (ref 31.4–37.4)
MCV RBC AUTO: 75 FL (ref 82–98)
MONOCYTES # BLD AUTO: 0.6 THOUSAND/ÂΜL (ref 0.17–1.22)
MONOCYTES NFR BLD AUTO: 13 % (ref 4–12)
NEUTROPHILS # BLD AUTO: 1.89 THOUSANDS/ÂΜL (ref 1.85–7.62)
NEUTS SEG NFR BLD AUTO: 39 % (ref 43–75)
NONHDLC SERPL-MCNC: 98 MG/DL
NRBC BLD AUTO-RTO: 0 /100 WBCS
PLATELET # BLD AUTO: 124 THOUSANDS/UL (ref 149–390)
POTASSIUM SERPL-SCNC: 4.2 MMOL/L (ref 3.5–5.3)
PROT SERPL-MCNC: 7.1 G/DL (ref 6.4–8.4)
RBC # BLD AUTO: 5.51 MILLION/UL (ref 3.88–5.62)
SODIUM SERPL-SCNC: 140 MMOL/L (ref 135–147)
TRIGL SERPL-MCNC: 48 MG/DL (ref ?–150)
TSH SERPL DL<=0.05 MIU/L-ACNC: 2.31 UIU/ML (ref 0.45–4.5)
WBC # BLD AUTO: 4.81 THOUSAND/UL (ref 4.31–10.16)

## 2025-06-09 PROCEDURE — 36415 COLL VENOUS BLD VENIPUNCTURE: CPT

## 2025-06-09 PROCEDURE — 80061 LIPID PANEL: CPT

## 2025-06-09 PROCEDURE — 83036 HEMOGLOBIN GLYCOSYLATED A1C: CPT

## 2025-06-09 PROCEDURE — 85025 COMPLETE CBC W/AUTO DIFF WBC: CPT

## 2025-06-09 PROCEDURE — 80053 COMPREHEN METABOLIC PANEL: CPT

## 2025-06-09 PROCEDURE — 84443 ASSAY THYROID STIM HORMONE: CPT

## 2025-06-11 ENCOUNTER — RESULTS FOLLOW-UP (OUTPATIENT)
Dept: FAMILY MEDICINE CLINIC | Facility: CLINIC | Age: 35
End: 2025-06-11

## 2025-06-30 ENCOUNTER — HOSPITAL ENCOUNTER (EMERGENCY)
Facility: HOSPITAL | Age: 35
Discharge: HOME | End: 2025-06-30
Attending: EMERGENCY MEDICINE | Admitting: EMERGENCY MEDICINE
Payer: COMMERCIAL

## 2025-06-30 VITALS
DIASTOLIC BLOOD PRESSURE: 97 MMHG | HEART RATE: 70 BPM | RESPIRATION RATE: 16 BRPM | TEMPERATURE: 98.8 F | SYSTOLIC BLOOD PRESSURE: 155 MMHG | OXYGEN SATURATION: 100 %

## 2025-06-30 DIAGNOSIS — R11.2 NAUSEA AND VOMITING: Primary | ICD-10-CM

## 2025-06-30 LAB
ALBUMIN SERPL BCG-MCNC: 4.8 G/DL (ref 3.5–5)
ALP SERPL-CCNC: 77 U/L (ref 34–104)
ALT SERPL W P-5'-P-CCNC: 36 U/L (ref 7–52)
ANION GAP SERPL CALCULATED.3IONS-SCNC: 8 MMOL/L (ref 4–13)
AST SERPL W P-5'-P-CCNC: 23 U/L (ref 13–39)
BASOPHILS # BLD AUTO: 0.01 THOUSANDS/ÂΜL (ref 0–0.1)
BASOPHILS NFR BLD AUTO: 0 % (ref 0–1)
BILIRUB SERPL-MCNC: 0.64 MG/DL (ref 0.2–1)
BUN SERPL-MCNC: 14 MG/DL (ref 5–25)
CALCIUM SERPL-MCNC: 9.7 MG/DL (ref 8.4–10.2)
CHLORIDE SERPL-SCNC: 102 MMOL/L (ref 96–108)
CO2 SERPL-SCNC: 26 MMOL/L (ref 21–32)
CREAT SERPL-MCNC: 0.82 MG/DL (ref 0.6–1.3)
EOSINOPHIL # BLD AUTO: 0.12 THOUSAND/ÂΜL (ref 0–0.61)
EOSINOPHIL NFR BLD AUTO: 2 % (ref 0–6)
ERYTHROCYTE [DISTWIDTH] IN BLOOD BY AUTOMATED COUNT: 15.1 % (ref 11.6–15.1)
GFR SERPL CREATININE-BSD FRML MDRD: 117 ML/MIN/1.73SQ M
GLUCOSE SERPL-MCNC: 80 MG/DL (ref 65–140)
HCT VFR BLD AUTO: 45.3 % (ref 36.5–49.3)
HGB BLD-MCNC: 14.2 G/DL (ref 12–17)
IMM GRANULOCYTES # BLD AUTO: 0.01 THOUSAND/UL (ref 0–0.2)
IMM GRANULOCYTES NFR BLD AUTO: 0 % (ref 0–2)
LIPASE SERPL-CCNC: 17 U/L (ref 11–82)
LYMPHOCYTES # BLD AUTO: 1.86 THOUSANDS/ÂΜL (ref 0.6–4.47)
LYMPHOCYTES NFR BLD AUTO: 27 % (ref 14–44)
MAGNESIUM SERPL-MCNC: 2 MG/DL (ref 1.9–2.7)
MCH RBC QN AUTO: 23.5 PG (ref 26.8–34.3)
MCHC RBC AUTO-ENTMCNC: 31.3 G/DL (ref 31.4–37.4)
MCV RBC AUTO: 75 FL (ref 82–98)
MONOCYTES # BLD AUTO: 0.75 THOUSAND/ÂΜL (ref 0.17–1.22)
MONOCYTES NFR BLD AUTO: 11 % (ref 4–12)
NEUTROPHILS # BLD AUTO: 4.18 THOUSANDS/ÂΜL (ref 1.85–7.62)
NEUTS SEG NFR BLD AUTO: 60 % (ref 43–75)
NRBC BLD AUTO-RTO: 0 /100 WBCS
PLATELET # BLD AUTO: 139 THOUSANDS/UL (ref 149–390)
POTASSIUM SERPL-SCNC: 3.8 MMOL/L (ref 3.5–5.3)
PROT SERPL-MCNC: 8.2 G/DL (ref 6.4–8.4)
RBC # BLD AUTO: 6.05 MILLION/UL (ref 3.88–5.62)
SODIUM SERPL-SCNC: 136 MMOL/L (ref 135–147)
WBC # BLD AUTO: 6.93 THOUSAND/UL (ref 4.31–10.16)

## 2025-06-30 PROCEDURE — 96374 THER/PROPH/DIAG INJ IV PUSH: CPT

## 2025-06-30 PROCEDURE — 96361 HYDRATE IV INFUSION ADD-ON: CPT

## 2025-06-30 PROCEDURE — 80053 COMPREHEN METABOLIC PANEL: CPT | Performed by: EMERGENCY MEDICINE

## 2025-06-30 PROCEDURE — 83690 ASSAY OF LIPASE: CPT | Performed by: EMERGENCY MEDICINE

## 2025-06-30 PROCEDURE — 99283 EMERGENCY DEPT VISIT LOW MDM: CPT

## 2025-06-30 PROCEDURE — 36415 COLL VENOUS BLD VENIPUNCTURE: CPT | Performed by: EMERGENCY MEDICINE

## 2025-06-30 PROCEDURE — 83735 ASSAY OF MAGNESIUM: CPT | Performed by: EMERGENCY MEDICINE

## 2025-06-30 PROCEDURE — 99284 EMERGENCY DEPT VISIT MOD MDM: CPT | Performed by: EMERGENCY MEDICINE

## 2025-06-30 PROCEDURE — 85025 COMPLETE CBC W/AUTO DIFF WBC: CPT | Performed by: EMERGENCY MEDICINE

## 2025-06-30 RX ORDER — ONDANSETRON 4 MG/1
4 TABLET, ORALLY DISINTEGRATING ORAL EVERY 6 HOURS PRN
Qty: 20 TABLET | Refills: 0 | Status: SHIPPED | OUTPATIENT
Start: 2025-06-30 | End: 2025-07-06

## 2025-06-30 RX ORDER — ONDANSETRON 2 MG/ML
4 INJECTION INTRAMUSCULAR; INTRAVENOUS ONCE
Status: COMPLETED | OUTPATIENT
Start: 2025-06-30 | End: 2025-06-30

## 2025-06-30 RX ADMIN — SODIUM CHLORIDE 1000 ML: 0.9 INJECTION, SOLUTION INTRAVENOUS at 18:37

## 2025-06-30 RX ADMIN — ONDANSETRON 4 MG: 2 INJECTION INTRAMUSCULAR; INTRAVENOUS at 18:37

## 2025-06-30 NOTE — Clinical Note
Paolo Monge was seen and treated in our emergency department on 6/30/2025.                Diagnosis:     Paolo  may return to work on return date.    He may return on this date: 07/01/2025         If you have any questions or concerns, please don't hesitate to call.      Waleska Roper, DO    ______________________________           _______________          _______________  Hospital Representative                              Date                                Time

## 2025-06-30 NOTE — ED PROVIDER NOTES
Time reflects when diagnosis was documented in both MDM as applicable and the Disposition within this note       Time User Action Codes Description Comment    6/30/2025  6:29 PM Waleska Roper Add [R11.2] Nausea and vomiting           ED Disposition       ED Disposition   Discharge    Condition   Stable    Date/Time   Mon Jun 30, 2025  7:08 PM    Comment   Paolo Monge discharge to home/self care.                   Assessment & Plan       Medical Decision Making  34 y.o. M p/w N/V.    Will check CMP/mg to r/o electrolyte abnormalities/LYDIA, lipase to r/o pancreatitis.    Amount and/or Complexity of Data Reviewed  Labs: ordered. Decision-making details documented in ED Course.    Risk  Prescription drug management.        ED Course as of 06/30/25 1943 Mon Jun 30, 2025   1807 Temperature: 98.8 °F (37.1 °C)  afebrile   1850 WBC: 6.93  Normal   1850 Hemoglobin: 14.2  Normal   1900 Comprehensive metabolic panel  Normal   1901 MAGNESIUM: 2.0  Normal   1901 LIPASE: 17  Normal   1909 Pt reports feeling better.  Pt/wife report that he has no sick days at work, so needs fax number for verification to company that he was here.  I told him I could give him a work excuse, but he states they need fax number and ED number. I informed pt, that if it's for a disability form, that we do not do any disability paperwork.        Medications   sodium chloride 0.9 % bolus 1,000 mL (1,000 mL Intravenous New Bag 6/30/25 1837)   ondansetron (ZOFRAN) injection 4 mg (4 mg Intravenous Given 6/30/25 1837)       ED Risk Strat Scores                    No data recorded        SBIRT 22yo+      Flowsheet Row Most Recent Value   Initial Alcohol Screen: US AUDIT-C     1. How often do you have a drink containing alcohol? 0 Filed at: 06/30/2025 1810   2. How many drinks containing alcohol do you have on a typical day you are drinking?  0 Filed at: 06/30/2025 1810   3a. Male UNDER 65: How often do you have five or more drinks on one occasion? 0  Filed at: 06/30/2025 1810   Audit-C Score 0 Filed at: 06/30/2025 1810   LOBO: How many times in the past year have you...    Used an illegal drug or used a prescription medication for non-medical reasons? Never Filed at: 06/30/2025 1810                            History of Present Illness       Chief Complaint   Patient presents with    Vomiting     Pt reports nausea and vomiting since yesterday. Denies abd pain.        Past Medical History[1]   Past Surgical History[2]   Family History[3]   Social History[4]   E-Cigarette/Vaping    E-Cigarette Use Never User       E-Cigarette/Vaping Substances    Nicotine No     THC No     CBD No     Flavoring No     Other No     Unknown No       I have reviewed and agree with the history as documented.     34 y.o. M p/w N/V x yesterday.  Unable to tolerate PO.  Denies specific sick contacts, but reports he works with a lot of people.  Denies F/C, HA, URI complaints, myalgias, abd pain,diarrhea, recent travel.      History provided by:  Patient and spouse  Vomiting  Associated symptoms: no abdominal pain, no chills, no cough, no diarrhea, no fever, no headaches, no myalgias and no sore throat        Review of Systems   Constitutional:  Negative for chills and fever.   HENT:  Negative for rhinorrhea and sore throat.    Respiratory:  Negative for cough.    Gastrointestinal:  Positive for nausea and vomiting. Negative for abdominal pain, constipation and diarrhea.   Genitourinary:  Negative for dysuria and frequency.   Musculoskeletal:  Negative for back pain and myalgias.   Neurological:  Negative for headaches.           Objective       ED Triage Vitals   Temperature Pulse Blood Pressure Respirations SpO2 Patient Position - Orthostatic VS   06/30/25 1754 06/30/25 1754 06/30/25 1754 06/30/25 1754 06/30/25 1754 06/30/25 1845   98.8 °F (37.1 °C) 73 148/77 18 100 % Lying      Temp Source Heart Rate Source BP Location FiO2 (%) Pain Score    06/30/25 1754 06/30/25 1754 06/30/25 1754 --  --    Oral Monitor Right arm        Vitals      Date and Time Temp Pulse SpO2 Resp BP Pain Score FACES Pain Rating User   06/30/25 1845 -- 70 100 % 16 155/97 -- -- LB   06/30/25 1754 98.8 °F (37.1 °C) 73 100 % 18 148/77 -- -- HMR            Physical Exam  Vitals and nursing note reviewed.   Constitutional:       General: He is not in acute distress.     Appearance: Normal appearance. He is well-developed. He is not ill-appearing, toxic-appearing or diaphoretic.     Eyes:      General: No scleral icterus.    Neck:      Vascular: No JVD.     Cardiovascular:      Rate and Rhythm: Normal rate and regular rhythm.   Pulmonary:      Effort: Pulmonary effort is normal. No accessory muscle usage or respiratory distress.      Breath sounds: Normal breath sounds. No stridor. No wheezing, rhonchi or rales.   Abdominal:      General: There is no distension.      Palpations: Abdomen is soft. Abdomen is not rigid. There is no mass.      Tenderness: There is no abdominal tenderness. There is no guarding or rebound. Negative signs include Buitrago's sign and McBurney's sign.     Skin:     General: Skin is warm and dry.      Coloration: Skin is not jaundiced or pale.      Findings: No rash.     Neurological:      Mental Status: He is alert.      GCS: GCS eye subscore is 4. GCS verbal subscore is 5. GCS motor subscore is 6.         Results Reviewed       Procedure Component Value Units Date/Time    Comprehensive metabolic panel [[id number]] Collected: 06/30/25 1838    Lab Status: Final result Specimen: Blood from Arm, Left Updated: 06/30/25 1859     Sodium 136 mmol/L      Potassium 3.8 mmol/L      Chloride 102 mmol/L      CO2 26 mmol/L      ANION GAP 8 mmol/L      BUN 14 mg/dL      Creatinine 0.82 mg/dL      Glucose 80 mg/dL      Calcium 9.7 mg/dL      AST 23 U/L      ALT 36 U/L      Alkaline Phosphatase 77 U/L      Total Protein 8.2 g/dL      Albumin 4.8 g/dL      Total Bilirubin 0.64 mg/dL      eGFR 117 ml/min/1.73sq m     Narrative:       National Kid[employer] guidelines for Chronic Kidney Disease (CKD):     Stage 1 with normal or high GFR (GFR > 90 mL/min/1.73 square meters)    Stage 2 Mild CKD (GFR = 60-89 mL/min/1.73 square meters)    Stage 3A Moderate CKD (GFR = 45-59 mL/min/1.73 square meters)    Stage 3B Moderate CKD (GFR = 30-44 mL/min/1.73 square meters)    Stage 4 Severe CKD (GFR = 15-29 mL/min/1.73 square meters)    Stage 5 End Stage CKD (GFR <15 mL/min/1.73 square meters)  Note: GFR calculation is accurate only with a steady state creatinine    Magnesium [[id number]]  (Normal) Collected: 06/30/25 1838    Lab Status: Final result Specimen: Blood from Arm, Left Updated: 06/30/25 1859     Magnesium 2.0 mg/dL     Lipase [[id number]]  (Normal) Collected: 06/30/25 1838    Lab Status: Final result Specimen: Blood from Arm, Left Updated: 06/30/25 1859     Lipase 17 u/L     CBC and differential [[id number]]  (Abnormal) Collected: 06/30/25 1838    Lab Status: Final result Specimen: Blood from Arm, Left Updated: 06/30/25 1848     WBC 6.93 Thousand/uL      RBC 6.05 Million/uL      Hemoglobin 14.2 g/dL      Hematocrit 45.3 %      MCV 75 fL      MCH 23.5 pg      MCHC 31.3 g/dL      RDW 15.1 %      Platelets 139 Thousands/uL      nRBC 0 /100 WBCs      Segmented % 60 %      Immature Grans % 0 %      Lymphocytes % 27 %      Monocytes % 11 %      Eosinophils Relative 2 %      Basophils Relative 0 %      Absolute Neutrophils 4.18 Thousands/µL      Absolute Immature Grans 0.01 Thousand/uL      Absolute Lymphocytes 1.86 Thousands/µL      Absolute Monocytes 0.75 Thousand/µL      Eosinophils Absolute 0.12 Thousand/µL      Basophils Absolute 0.01 Thousands/µL             No orders to display       Procedures    ED Medication and Procedure Management   Prior to Admission Medications   Prescriptions Last Dose Informant Patient Reported? Taking?   hyoscyamine (ANASPAZ,LEVSIN) 0.125 MG tablet   No No   Sig: Take 1 tablet (0.125 mg total) by mouth every 4  (four) hours as needed for cramping   nystatin (MYCOSTATIN) powder   No No   Sig: Apply topically 2 (two) times a day   ondansetron (ZOFRAN) 4 mg tablet   No No   Sig: Take 1 tablet (4 mg total) by mouth every 8 (eight) hours as needed for nausea or vomiting for up to 7 doses   terbinafine (LamISIL) 1 % cream   No No   Sig: Apply topically 2 (two) times a day      Facility-Administered Medications: None     Discharge Medication List as of 6/30/2025  7:09 PM        START taking these medications    Details   ondansetron (ZOFRAN-ODT) 4 mg disintegrating tablet Take 1 tablet (4 mg total) by mouth every 6 (six) hours as needed for nausea, Starting Mon 6/30/2025, Normal           CONTINUE these medications which have NOT CHANGED    Details   hyoscyamine (ANASPAZ,LEVSIN) 0.125 MG tablet Take 1 tablet (0.125 mg total) by mouth every 4 (four) hours as needed for cramping, Starting Tue 1/21/2025, Normal      nystatin (MYCOSTATIN) powder Apply topically 2 (two) times a day, Starting Wed 5/21/2025, Normal      ondansetron (ZOFRAN) 4 mg tablet Take 1 tablet (4 mg total) by mouth every 8 (eight) hours as needed for nausea or vomiting for up to 7 doses, Starting Tue 1/21/2025, Normal      terbinafine (LamISIL) 1 % cream Apply topically 2 (two) times a day, Starting Wed 5/21/2025, Normal           No discharge procedures on file.  ED SEPSIS DOCUMENTATION   Time reflects when diagnosis was documented in both MDM as applicable and the Disposition within this note       Time User Action Codes Description Comment    6/30/2025  6:29 PM Waleska Roper Add [R11.2] Nausea and vomiting                      [1] No past medical history on file.  [2] No past surgical history on file.  [3]   Family History  Problem Relation Name Age of Onset    Hypertension Mother      Diabetes Mother      Lung disease Mother      No Known Problems Father      Depression Sister      No Known Problems Sister      No Known Problems Brother      No Known  Problems Brother      No Known Problems Brother     [4]   Social History  Tobacco Use    Smoking status: Some Days     Types: Cigarettes, Cigars     Start date: 2022     Passive exposure: Current    Smokeless tobacco: Never   Vaping Use    Vaping status: Never Used   Substance Use Topics    Alcohol use: Yes     Comment: SOCIALLY    Drug use: Never        Waleska Roper DO  06/30/25 1944

## 2025-07-01 ENCOUNTER — HOSPITAL ENCOUNTER (EMERGENCY)
Facility: HOSPITAL | Age: 35
Discharge: HOME | End: 2025-07-01
Attending: EMERGENCY MEDICINE | Admitting: EMERGENCY MEDICINE
Payer: COMMERCIAL

## 2025-07-01 VITALS
BODY MASS INDEX: 36.71 KG/M2 | SYSTOLIC BLOOD PRESSURE: 111 MMHG | RESPIRATION RATE: 18 BRPM | WEIGHT: 274.47 LBS | HEART RATE: 87 BPM | DIASTOLIC BLOOD PRESSURE: 60 MMHG | OXYGEN SATURATION: 99 % | TEMPERATURE: 98.1 F

## 2025-07-01 DIAGNOSIS — R82.90 ABNORMAL URINE: ICD-10-CM

## 2025-07-01 DIAGNOSIS — R11.2 NAUSEA AND VOMITING: Primary | ICD-10-CM

## 2025-07-01 LAB
ALBUMIN SERPL BCG-MCNC: 4.6 G/DL (ref 3.5–5)
ALP SERPL-CCNC: 73 U/L (ref 34–104)
ALT SERPL W P-5'-P-CCNC: 32 U/L (ref 7–52)
ANION GAP SERPL CALCULATED.3IONS-SCNC: 11 MMOL/L (ref 4–13)
ANISOCYTOSIS BLD QL SMEAR: PRESENT
AST SERPL W P-5'-P-CCNC: 21 U/L (ref 13–39)
BACTERIA UR QL AUTO: ABNORMAL /HPF
BASOPHILS # BLD MANUAL: 0 THOUSAND/UL (ref 0–0.1)
BASOPHILS NFR MAR MANUAL: 0 % (ref 0–1)
BILIRUB SERPL-MCNC: 0.64 MG/DL (ref 0.2–1)
BILIRUB UR QL STRIP: ABNORMAL
BUN SERPL-MCNC: 14 MG/DL (ref 5–25)
CALCIUM SERPL-MCNC: 9.5 MG/DL (ref 8.4–10.2)
CHLORIDE SERPL-SCNC: 101 MMOL/L (ref 96–108)
CLARITY UR: CLEAR
CO2 SERPL-SCNC: 25 MMOL/L (ref 21–32)
COLOR UR: YELLOW
CREAT SERPL-MCNC: 0.86 MG/DL (ref 0.6–1.3)
EOSINOPHIL # BLD MANUAL: 0.25 THOUSAND/UL (ref 0–0.4)
EOSINOPHIL NFR BLD MANUAL: 4 % (ref 0–6)
ERYTHROCYTE [DISTWIDTH] IN BLOOD BY AUTOMATED COUNT: 15.2 % (ref 11.6–15.1)
GFR SERPL CREATININE-BSD FRML MDRD: 114 ML/MIN/1.73SQ M
GLUCOSE SERPL-MCNC: 91 MG/DL (ref 65–140)
GLUCOSE UR STRIP-MCNC: NEGATIVE MG/DL
HCT VFR BLD AUTO: 43.1 % (ref 36.5–49.3)
HGB BLD-MCNC: 13.8 G/DL (ref 12–17)
HGB UR QL STRIP.AUTO: NEGATIVE
HYALINE CASTS #/AREA URNS LPF: ABNORMAL /LPF
KETONES UR STRIP-MCNC: ABNORMAL MG/DL
LEUKOCYTE ESTERASE UR QL STRIP: NEGATIVE
LYMPHOCYTES # BLD AUTO: 1.11 THOUSAND/UL (ref 0.6–4.47)
LYMPHOCYTES # BLD AUTO: 18 % (ref 14–44)
MACROCYTES BLD QL AUTO: PRESENT
MCH RBC QN AUTO: 23.8 PG (ref 26.8–34.3)
MCHC RBC AUTO-ENTMCNC: 32 G/DL (ref 31.4–37.4)
MCV RBC AUTO: 74 FL (ref 82–98)
MONOCYTES # BLD AUTO: 0.98 THOUSAND/UL (ref 0–1.22)
MONOCYTES NFR BLD: 16 % (ref 4–12)
MUCOUS THREADS UR QL AUTO: ABNORMAL
NEUTROPHILS # BLD MANUAL: 3.81 THOUSAND/UL (ref 1.85–7.62)
NEUTS SEG NFR BLD AUTO: 62 % (ref 43–75)
NITRITE UR QL STRIP: NEGATIVE
NON-SQ EPI CELLS URNS QL MICRO: ABNORMAL /HPF
PH UR STRIP.AUTO: 6 [PH] (ref 4.5–8)
PLATELET # BLD AUTO: 129 THOUSANDS/UL (ref 149–390)
PLATELET BLD QL SMEAR: ADEQUATE
PMV BLD AUTO: 11.3 FL (ref 8.9–12.7)
POTASSIUM SERPL-SCNC: 3.6 MMOL/L (ref 3.5–5.3)
PROT SERPL-MCNC: 8.1 G/DL (ref 6.4–8.4)
PROT UR STRIP-MCNC: ABNORMAL MG/DL
RBC # BLD AUTO: 5.81 MILLION/UL (ref 3.88–5.62)
RBC #/AREA URNS AUTO: ABNORMAL /HPF
RBC MORPH BLD: PRESENT
SODIUM SERPL-SCNC: 137 MMOL/L (ref 135–147)
SP GR UR STRIP.AUTO: 1.02 (ref 1–1.03)
UROBILINOGEN UR QL STRIP.AUTO: 0.2 E.U./DL
WBC # BLD AUTO: 6.14 THOUSAND/UL (ref 4.31–10.16)
WBC #/AREA URNS AUTO: ABNORMAL /HPF

## 2025-07-01 PROCEDURE — 99283 EMERGENCY DEPT VISIT LOW MDM: CPT

## 2025-07-01 PROCEDURE — 85027 COMPLETE CBC AUTOMATED: CPT | Performed by: PHYSICIAN ASSISTANT

## 2025-07-01 PROCEDURE — 81001 URINALYSIS AUTO W/SCOPE: CPT

## 2025-07-01 PROCEDURE — 96374 THER/PROPH/DIAG INJ IV PUSH: CPT

## 2025-07-01 PROCEDURE — 96361 HYDRATE IV INFUSION ADD-ON: CPT

## 2025-07-01 PROCEDURE — 99284 EMERGENCY DEPT VISIT MOD MDM: CPT | Performed by: EMERGENCY MEDICINE

## 2025-07-01 PROCEDURE — 80053 COMPREHEN METABOLIC PANEL: CPT | Performed by: PHYSICIAN ASSISTANT

## 2025-07-01 PROCEDURE — 85007 BL SMEAR W/DIFF WBC COUNT: CPT | Performed by: PHYSICIAN ASSISTANT

## 2025-07-01 PROCEDURE — 36415 COLL VENOUS BLD VENIPUNCTURE: CPT | Performed by: PHYSICIAN ASSISTANT

## 2025-07-01 RX ORDER — METOCLOPRAMIDE HYDROCHLORIDE 5 MG/ML
10 INJECTION INTRAMUSCULAR; INTRAVENOUS ONCE
Status: COMPLETED | OUTPATIENT
Start: 2025-07-01 | End: 2025-07-01

## 2025-07-01 RX ADMIN — METOCLOPRAMIDE 10 MG: 5 INJECTION, SOLUTION INTRAMUSCULAR; INTRAVENOUS at 15:17

## 2025-07-01 RX ADMIN — SODIUM CHLORIDE 1000 ML: 0.9 INJECTION, SOLUTION INTRAVENOUS at 15:17

## 2025-07-01 NOTE — Clinical Note
Paolo Monge was seen and treated in our emergency department on 7/1/2025.                Diagnosis:     Paolo  .    He may return on this date: 07/03/2025         If you have any questions or concerns, please don't hesitate to call.      Laverne Shaffer PA-C    ______________________________           _______________          _______________  Hospital Representative                              Date                                Time

## 2025-07-01 NOTE — ED PROVIDER NOTES
Time reflects when diagnosis was documented in both MDM as applicable and the Disposition within this note       Time User Action Codes Description Comment    7/1/2025  4:36 PM Laverne Shaffer Add [R11.2] Nausea and vomiting     7/1/2025  4:36 PM Shaffer, Laverne EDGAR Add [R82.90] Abnormal urine           ED Disposition       ED Disposition   Discharge    Condition   Stable    Date/Time   Tue Jul 1, 2025  4:36 PM    Comment   Paolo Monge discharge to home/self care.                   Assessment & Plan       Medical Decision Making  34y.o male presents to the ER for nausea and vomiting for 2 days. Patient is hypertensive. Will monitor. Otherwise vitals are stable. Patient is in no acute distress. On exam, breathing is non-labored. No tachypnea or accessory muscle use. Lungs are clear. Heart is regular rate and rhythm. Abdomen is soft and non-tender. No guarding, rigidity, distention or pulsatile masses palpated. DDX consists of but not limited to: viral syndrome, dehydration, electrolyte abnormality, infection (doubt with normal vitals and no fevers), abdominal pathology (doubt with no abdominal tenderness to palpation). Will check labs and reassess.     1547 WBC: 6.14    1547 Hemoglobin: 13.8    1547 Platelet Count(!): 129    1548 Comprehensive metabolic panel - Normal.    1548 Ketones, UA(!): >=160 (4+)    1635 Patient reports feeling better is ready for discharge.     1645     Patient is tolerating PO. Informed him of lab findings. Will discharge.    The management plan was discussed in detail with the patient at bedside and all questions were answered.  Prior to discharge, we provided both verbal and written instructions.  We discussed with the patient the signs and symptoms for which to return to the emergency department.  All questions were answered and patient was comfortable with the plan of care and discharged to home.  Instructed the patient to follow up with the primary care provider and/or specialist  provided and their written instructions.  The patient verbalized understanding of our discussion and plan of care, and agrees to return to the Emergency Department for concerns and progression of illness.    At discharge, I instructed the patient to:  -follow up with pcp  -take Zofran as previously prescribed  -rest and drink plenty of fluids  -return to the ER if symptoms worsened or new symptoms arose  Patient agreed to this plan and was stable at time of discharge.       Problems Addressed:  Abnormal urine: acute illness or injury  Nausea and vomiting: acute illness or injury    Amount and/or Complexity of Data Reviewed  Independent Historian:      Details: Patient is historian  Labs: ordered. Decision-making details documented in ED Course.    Risk  Prescription drug management.        ED Course as of 07/01/25 1720   Tue Jul 01, 2025   1547 WBC: 6.14   1547 Hemoglobin: 13.8   1547 Platelet Count(!): 129   1548 Comprehensive metabolic panel  Normal.   1548 Ketones, UA(!): >=160 (4+)   1635 Patient reports feeling better is ready for discharge.        Medications   sodium chloride 0.9 % bolus 1,000 mL (0 mL Intravenous Stopped 7/1/25 1617)   metoclopramide (REGLAN) injection 10 mg (10 mg Intravenous Given 7/1/25 1517)       ED Risk Strat Scores                    No data recorded        SBIRT 20yo+      Flowsheet Row Most Recent Value   Initial Alcohol Screen: US AUDIT-C     1. How often do you have a drink containing alcohol? 0 Filed at: 07/01/2025 1453   2. How many drinks containing alcohol do you have on a typical day you are drinking?  0 Filed at: 07/01/2025 1453   3a. Male UNDER 65: How often do you have five or more drinks on one occasion? 0 Filed at: 07/01/2025 1453   Audit-C Score 0 Filed at: 07/01/2025 1453   LOBO: How many times in the past year have you...    Used an illegal drug or used a prescription medication for non-medical reasons? Never Filed at: 07/01/2025 1453                             History of Present Illness       Chief Complaint   Patient presents with    Vomiting     Worsening vomiting. Seen here for same yesterday and d/c. Concerned due to not eating.        Past Medical History[1]   Past Surgical History[2]   Family History[3]   Social History[4]   E-Cigarette/Vaping    E-Cigarette Use Never User       E-Cigarette/Vaping Substances    Nicotine No     THC No     CBD No     Flavoring No     Other No     Unknown No       I have reviewed and agree with the history as documented.     34y.o male with no significant PMH presents to the ER for nausea and vomiting for 2 days. Patient was seen yesterday for symptoms and discharged home with Zofran. He has been taking Zofran for symptoms. He denies pain. Symptoms are constant. He denies sick contacts or recent travel. He denies fever, chills, URI symptoms, chest pain, dyspnea, diarrhea, abdominal pain, urinary symptoms, weakness or paresthesias.      History provided by:  Patient   used: No        Review of Systems   Constitutional:  Negative for activity change, appetite change, chills and fever.   HENT:  Negative for congestion, drooling, ear discharge, ear pain, facial swelling, rhinorrhea and sore throat.    Eyes:  Negative for redness.   Respiratory:  Negative for cough and shortness of breath.    Cardiovascular:  Negative for chest pain.   Gastrointestinal:  Positive for nausea and vomiting. Negative for abdominal pain and diarrhea.   Genitourinary:  Negative for dysuria, frequency, hematuria and urgency.   Musculoskeletal:  Negative for neck stiffness.   Skin:  Negative for rash.   Allergic/Immunologic: Negative for food allergies.   Neurological:  Negative for weakness and numbness.           Objective       ED Triage Vitals   Temperature Pulse Blood Pressure Respirations SpO2 Patient Position - Orthostatic VS   07/01/25 1442 07/01/25 1442 07/01/25 1442 07/01/25 1442 07/01/25 1442 07/01/25 1600   98.1 °F (36.7 °C) 68  (!) 161/101 18 96 % Lying      Temp src Heart Rate Source BP Location FiO2 (%) Pain Score    -- 07/01/25 1600 07/01/25 1600 -- --     Monitor Right arm        Vitals      Date and Time Temp Pulse SpO2 Resp BP Pain Score FACES Pain Rating User   07/01/25 1600 -- 87 99 % 18 111/60 -- -- [facility]   07/01/25 1500 -- 87 98 % -- 126/78 -- -- [facility]   07/01/25 1442 98.1 °F (36.7 °C) 68 96 % 18 161/101 -- -- [facility]            Physical Exam  Vitals and nursing note reviewed.   Constitutional:       General: He is not in acute distress.     Appearance: He is not toxic-appearing.   HENT:      Head: Normocephalic and atraumatic.      Mouth/Throat:      Lips: Pink. No lesions.      Mouth: Mucous membranes are moist.     Eyes:      Conjunctiva/sclera: Conjunctivae normal.     Neck:      Trachea: No tracheal deviation.     Cardiovascular:      Rate and Rhythm: Normal rate and regular rhythm.      Heart sounds: Normal heart sounds, S1 normal and S2 normal. No murmur heard.     No friction rub. No gallop.   Pulmonary:      Effort: Pulmonary effort is normal. No respiratory distress.      Breath sounds: Normal breath sounds. No decreased breath sounds, wheezing, rhonchi or rales.   Chest:      Chest wall: No tenderness.   Abdominal:      General: Bowel sounds are normal. There is no distension.      Palpations: Abdomen is soft.      Tenderness: There is no abdominal tenderness. There is no guarding or rebound.     Musculoskeletal:      Cervical back: Normal range of motion and neck supple.     Skin:     General: Skin is warm and dry.      Findings: No rash.     Neurological:      Mental Status: He is alert.      GCS: GCS eye subscore is 4. GCS verbal subscore is 5. GCS motor subscore is 6.     Psychiatric:         Mood and Affect: Mood normal.         Results Reviewed       Procedure Component Value Units Date/Time    Urine Microscopic [[id number]]  (Abnormal) Collected: 07/01/25 1545    Lab Status: Final result Specimen: Urine, Clean Catch  Updated: 07/01/25 1631     RBC, UA None Seen /hpf      WBC, UA None Seen /hpf      Epithelial Cells Occasional /hpf      Bacteria, UA None Seen /hpf      MUCUS THREADS Moderate     Hyaline Casts, UA 0-3 /lpf     Manual Differential(PHLEBS Do Not Order) [[id number]]  (Abnormal) Collected: 07/01/25 1520    Lab Status: Final result Specimen: Blood from Arm, Right Updated: 07/01/25 1619     Segmented % 62 %      Lymphocytes % 18 %      Monocytes % 16 %      Eosinophils % 4 %      Basophils % 0 %      Absolute Neutrophils 3.81 Thousand/uL      Absolute Lymphocytes 1.11 Thousand/uL      Absolute Monocytes 0.98 Thousand/uL      Absolute Eosinophils 0.25 Thousand/uL      Absolute Basophils 0.00 Thousand/uL      Total Counted --     RBC Morphology Present     Platelet Estimate Adequate     Anisocytosis Present     Macrocytes Present    Urine Macroscopic, POC [[id number]]  (Abnormal) Collected: 07/01/25 1545    Lab Status: Final result Specimen: Urine Updated: 07/01/25 1547     Color, UA Yellow     Clarity, UA Clear     pH, UA 6.0     Leukocytes, UA Negative     Nitrite, UA Negative     Protein, UA 30 (1+) mg/dl      Glucose, UA Negative mg/dl      Ketones, UA >=160 (4+) mg/dl      Urobilinogen, UA 0.2 E.U./dl      Bilirubin, UA Small     Occult Blood, UA Negative     Specific Gravity, UA 1.025    Narrative:      CLINITEK RESULT    CBC and differential [[id number]]  (Abnormal) Collected: 07/01/25 1520    Lab Status: Final result Specimen: Blood from Arm, Right Updated: 07/01/25 1540     WBC 6.14 Thousand/uL      RBC 5.81 Million/uL      Hemoglobin 13.8 g/dL      Hematocrit 43.1 %      MCV 74 fL      MCH 23.8 pg      MCHC 32.0 g/dL      RDW 15.2 %      MPV 11.3 fL      Platelets 129 Thousands/uL     Comprehensive metabolic panel [[id number]] Collected: 07/01/25 1520    Lab Status: Final result Specimen: Blood from Arm, Right Updated: 07/01/25 1540     Sodium 137 mmol/L      Potassium 3.6 mmol/L      Chloride 101 mmol/L      CO2 25  mmol/L      ANION GAP 11 mmol/L      BUN 14 mg/dL      Creatinine 0.86 mg/dL      Glucose 91 mg/dL      Calcium 9.5 mg/dL      AST 21 U/L      ALT 32 U/L      Alkaline Phosphatase 73 U/L      Total Protein 8.1 g/dL      Albumin 4.6 g/dL      Total Bilirubin 0.64 mg/dL      eGFR 114 ml/min/1.73sq m     Narrative:      National Kid[employer] Foundation guidelines for Chronic Kidney Disease (CKD):     Stage 1 with normal or high GFR (GFR > 90 mL/min/1.73 square meters)    Stage 2 Mild CKD (GFR = 60-89 mL/min/1.73 square meters)    Stage 3A Moderate CKD (GFR = 45-59 mL/min/1.73 square meters)    Stage 3B Moderate CKD (GFR = 30-44 mL/min/1.73 square meters)    Stage 4 Severe CKD (GFR = 15-29 mL/min/1.73 square meters)    Stage 5 End Stage CKD (GFR <15 mL/min/1.73 square meters)  Note: GFR calculation is accurate only with a steady state creatinine            No orders to display       Procedures    ED Medication and Procedure Management   Prior to Admission Medications   Prescriptions Last Dose Informant Patient Reported? Taking?   hyoscyamine (ANASPAZ,LEVSIN) 0.125 MG tablet   No No   Sig: Take 1 tablet (0.125 mg total) by mouth every 4 (four) hours as needed for cramping   nystatin (MYCOSTATIN) powder   No No   Sig: Apply topically 2 (two) times a day   ondansetron (ZOFRAN) 4 mg tablet   No No   Sig: Take 1 tablet (4 mg total) by mouth every 8 (eight) hours as needed for nausea or vomiting for up to 7 doses   ondansetron (ZOFRAN-ODT) 4 mg disintegrating tablet   No No   Sig: Take 1 tablet (4 mg total) by mouth every 6 (six) hours as needed for nausea   terbinafine (LamISIL) 1 % cream   No No   Sig: Apply topically 2 (two) times a day      Facility-Administered Medications: None     Discharge Medication List as of 7/1/2025  4:37 PM        CONTINUE these medications which have NOT CHANGED    Details   hyoscyamine (ANASPAZ,LEVSIN) 0.125 MG tablet Take 1 tablet (0.125 mg total) by mouth every 4 (four) hours as needed for  cramping, Starting Tue 1/21/2025, Normal      nystatin (MYCOSTATIN) powder Apply topically 2 (two) times a day, Starting Wed 5/21/2025, Normal      ondansetron (ZOFRAN) 4 mg tablet Take 1 tablet (4 mg total) by mouth every 8 (eight) hours as needed for nausea or vomiting for up to 7 doses, Starting Tue 1/21/2025, Normal      ondansetron (ZOFRAN-ODT) 4 mg disintegrating tablet Take 1 tablet (4 mg total) by mouth every 6 (six) hours as needed for nausea, Starting Mon 6/30/2025, Normal      terbinafine (LamISIL) 1 % cream Apply topically 2 (two) times a day, Starting Wed 5/21/2025, Normal           No discharge procedures on file.  ED SEPSIS DOCUMENTATION   Time reflects when diagnosis was documented in both MDM as applicable and the Disposition within this note       Time User Action Codes Description Comment    7/1/2025  4:36 PM Laverne Shaffer Add [R11.2] Nausea and vomiting     7/1/2025  4:36 PM Laverne Shaffer Add [R82.90] Abnormal urine                      [1] No past medical history on file.  [2] No past surgical history on file.  [3]   Family History  Problem Relation Name Age of Onset    Hypertension Mother      Diabetes Mother      Lung disease Mother      No Known Problems Father      Depression Sister      No Known Problems Sister      No Known Problems Brother      No Known Problems Brother      No Known Problems Brother     [4]   Social History  Tobacco Use    Smoking status: Some Days     Types: Cigarettes, Cigars     Start date: 2022     Passive exposure: Current    Smokeless tobacco: Never   Vaping Use    Vaping status: Never Used   Substance Use Topics    Alcohol use: Yes     Comment: SOCIALLY    Drug use: Never        Laverne Shaffer PA-C  07/01/25 1012

## 2025-07-01 NOTE — DISCHARGE INSTRUCTIONS
DISCHARGE INSTRUCTIONS:    FOLLOW UP WITH YOUR PRIMARY CARE PROVIDER OR THE [facility]. MAKE AN APPOINTMENT TO BE SEEN.     TAKE ZOFRAN AS PREVIOUSLY PRESCRIBED. IF RASH, SHORTNESS OF BREATH OR TROUBLE SWALLOWING, STOP TAKING THE MEDICATION AND BE SEEN.     REST AND DRINK PLENTY OF FLUIDS.    IF SYMPTOMS WORSEN OR NEW SYMPTOMS ARISE, RETURN TO THE ER TO BE SEEN.

## 2025-07-02 ENCOUNTER — OFFICE VISIT (OUTPATIENT)
Dept: FAMILY MEDICINE CLINIC | Facility: CLINIC | Age: 35
End: 2025-07-02
Payer: COMMERCIAL

## 2025-07-02 VITALS
RESPIRATION RATE: 19 BRPM | TEMPERATURE: 97.9 F | SYSTOLIC BLOOD PRESSURE: 142 MMHG | WEIGHT: 273.2 LBS | BODY MASS INDEX: 36.54 KG/M2 | HEART RATE: 73 BPM | DIASTOLIC BLOOD PRESSURE: 100 MMHG | OXYGEN SATURATION: 98 %

## 2025-07-02 DIAGNOSIS — A08.4 VIRAL GASTROENTERITIS: Primary | ICD-10-CM

## 2025-07-02 PROCEDURE — 99214 OFFICE O/P EST MOD 30 MIN: CPT

## 2025-07-02 RX ORDER — METOCLOPRAMIDE HYDROCHLORIDE 5 MG/5ML
10 SOLUTION ORAL
Qty: 120 ML | Refills: 0 | Status: SHIPPED | OUTPATIENT
Start: 2025-07-02

## 2025-07-02 RX ORDER — FAMOTIDINE 20 MG/1
20 TABLET, FILM COATED ORAL
Qty: 90 TABLET | Refills: 3 | Status: SHIPPED | OUTPATIENT
Start: 2025-07-02 | End: 2026-06-27

## 2025-07-02 NOTE — PROGRESS NOTES
Name: Paolo Monge      : 1990      MRN: [id number]  Encounter Provider: TARA Keith  Encounter Date: 2025   Encounter department: [facility]  :  Assessment & Plan  Viral gastroenteritis  Paolo Monge is a 34 y.o. male who presents for evaluation of nonbilious vomiting a few times per day, heartburn, and nausea. Symptoms have been present for 3 days. Patient denies acholic stools, blood in stool, constipation, dark urine, diarrhea 0 times per day, dysuria, fever, hematemesis, hematuria, and melena. Patient's oral intake has been decreased for liquids. Patient's urine output has been adequate. Other contacts with similar symptoms include: none. Patient denies recent travel history. Patient has not had recent ingestion of possible contaminated food, toxic plants, or inappropriate medications/poisons.     Acute Gastroenteritis.  1. Discussed oral rehydration, reintroduction of solid foods, signs of dehydration.  2. Return or go to emergency department if worsening symptoms, blood or bile, signs of dehydration, diarrhea lasting longer than 5 days or any new concerns.  3. Follow up in 7 days or sooner as needed.  Orders:    metoclopramide (REGLAN) 5 mg/5 mL oral solution; Take 10 mL (10 mg total) by mouth 4 (four) times a day (before meals and at bedtime)    famotidine (PEPCID) 20 mg tablet; Take 1 tablet (20 mg total) by mouth daily at bedtime           History of Present Illness   Vomiting   This is a recurrent problem. Episode onset: 25. The problem occurs 2 to 4 times per day. The problem has been gradually improving. The emesis has an appearance of stomach contents. There has been no fever. Associated symptoms include chills and sweats. Pertinent negatives include no abdominal pain, arthralgias, chest pain, coughing, diarrhea, dizziness, fever, headaches, myalgias, URI or weight loss. Treatments tried: zofran. The treatment provided no relief.      Review of Systems   Constitutional:  Positive for chills. Negative for activity change, fatigue, fever and weight loss.   HENT:  Negative for congestion, ear pain, rhinorrhea and sore throat.    Eyes:  Negative for pain and visual disturbance.   Respiratory:  Negative for cough, chest tightness and shortness of breath.    Cardiovascular:  Negative for chest pain, palpitations and leg swelling.   Gastrointestinal:  Positive for nausea and vomiting. Negative for abdominal distention, abdominal pain, anal bleeding, blood in stool, constipation, diarrhea and rectal pain.   Genitourinary:  Negative for decreased urine volume, dysuria, frequency, hematuria and urgency.   Musculoskeletal:  Negative for arthralgias, back pain and myalgias.   Skin:  Negative for color change and rash.   Neurological:  Negative for dizziness, seizures, syncope and headaches.   Psychiatric/Behavioral:  Negative for dysphoric mood. The patient is not nervous/anxious.    All other systems reviewed and are negative.      Objective   /100 (BP Location: Left arm, Patient Position: Sitting, Cuff Size: Large)   Pulse 73   Temp 97.9 °F (36.6 °C) (Temporal)   Resp 19   Wt 124 kg (273 lb 3.2 oz)   SpO2 98%   BMI 36.54 kg/m²      Physical Exam  Vitals and nursing note reviewed.   Constitutional:       Appearance: Normal appearance. He is well-developed.   HENT:      Head: Normocephalic and atraumatic.      Right Ear: Tympanic membrane, ear canal and external ear normal. There is no impacted cerumen.      Left Ear: Tympanic membrane, ear canal and external ear normal. There is no impacted cerumen.      Nose: Nose normal.      Mouth/Throat:      Mouth: Mucous membranes are moist.      Pharynx: Oropharynx is clear.     Eyes:      Extraocular Movements: Extraocular movements intact.      Conjunctiva/sclera: Conjunctivae normal.      Pupils: Pupils are equal, round, and reactive to light.       Cardiovascular:      Rate and Rhythm: Normal rate  and regular rhythm.      Pulses: Normal pulses.      Heart sounds: Normal heart sounds. No murmur heard.  Pulmonary:      Effort: Pulmonary effort is normal. No respiratory distress.      Breath sounds: Normal breath sounds.   Abdominal:      General: Abdomen is flat. Bowel sounds are normal. There is no distension.      Palpations: Abdomen is soft. There is no mass.      Tenderness: There is no abdominal tenderness. There is no guarding or rebound. Negative signs include Buitrago's sign.      Hernia: No hernia is present.     Musculoskeletal:         General: No swelling. Normal range of motion.      Cervical back: Normal range of motion and neck supple.      Right lower leg: No edema.      Left lower leg: No edema.     Skin:     General: Skin is warm and dry.      Capillary Refill: Capillary refill takes less than 2 seconds.     Neurological:      General: No focal deficit present.      Mental Status: He is alert and oriented to person, place, and time. Mental status is at baseline.     Psychiatric:         Mood and Affect: Mood normal.         Behavior: Behavior normal.         Thought Content: Thought content normal.         Judgment: Judgment normal.       Administrative Statements   I have spent a total time of 30 minutes in caring for this patient on the day of the visit/encounter including Diagnostic results, Prognosis, Risks and benefits of tx options, Instructions for management, Patient and family education, Importance of tx compliance, Risk factor reductions, Impressions, Counseling / Coordination of care, Documenting in the medical record, Reviewing/placing orders in the medical record (including tests, medications, and/or procedures), and Obtaining or reviewing history  .

## 2025-07-02 NOTE — ASSESSMENT & PLAN NOTE
Wt Readings from Last 3 Encounters:   07/02/25 124 kg (273 lb 3.2 oz)   07/01/25 124 kg (274 lb 7.6 oz)   05/21/25 124 kg (272 lb 9.6 oz)     Would like to see a nutritionist, referral placed

## 2025-07-02 NOTE — ASSESSMENT & PLAN NOTE
Paolo Monge is a 34 y.o. male who presents for evaluation of nonbilious vomiting a few times per day, heartburn, and nausea. Symptoms have been present for 3 days. Patient denies acholic stools, blood in stool, constipation, dark urine, diarrhea 0 times per day, dysuria, fever, hematemesis, hematuria, and melena. Patient's oral intake has been decreased for liquids. Patient's urine output has been adequate. Other contacts with similar symptoms include: none. Patient denies recent travel history. Patient has not had recent ingestion of possible contaminated food, toxic plants, or inappropriate medications/poisons.     Acute Gastroenteritis.  1. Discussed oral rehydration, reintroduction of solid foods, signs of dehydration.  2. Return or go to emergency department if worsening symptoms, blood or bile, signs of dehydration, diarrhea lasting longer than 5 days or any new concerns.  3. Follow up in 7 days or sooner as needed.  Orders:    metoclopramide (REGLAN) 5 mg/5 mL oral solution; Take 10 mL (10 mg total) by mouth 4 (four) times a day (before meals and at bedtime)    famotidine (PEPCID) 20 mg tablet; Take 1 tablet (20 mg total) by mouth daily at bedtime

## 2025-07-06 ENCOUNTER — HOSPITAL ENCOUNTER (EMERGENCY)
Facility: HOSPITAL | Age: 35
Discharge: HOME | End: 2025-07-06
Attending: EMERGENCY MEDICINE | Admitting: EMERGENCY MEDICINE
Payer: COMMERCIAL

## 2025-07-06 ENCOUNTER — APPOINTMENT (EMERGENCY)
Dept: CT IMAGING | Facility: HOSPITAL | Age: 35
End: 2025-07-06
Payer: COMMERCIAL

## 2025-07-06 VITALS
HEART RATE: 73 BPM | DIASTOLIC BLOOD PRESSURE: 91 MMHG | RESPIRATION RATE: 18 BRPM | SYSTOLIC BLOOD PRESSURE: 141 MMHG | TEMPERATURE: 98.2 F | WEIGHT: 267.64 LBS | BODY MASS INDEX: 35.8 KG/M2 | OXYGEN SATURATION: 92 %

## 2025-07-06 DIAGNOSIS — D69.6 THROMBOCYTOPENIA (HCC): ICD-10-CM

## 2025-07-06 DIAGNOSIS — R11.2 NAUSEA AND VOMITING: ICD-10-CM

## 2025-07-06 DIAGNOSIS — Z72.4 EATING PROBLEM: ICD-10-CM

## 2025-07-06 DIAGNOSIS — E86.0 DEHYDRATION: ICD-10-CM

## 2025-07-06 DIAGNOSIS — R11.2 NAUSEA & VOMITING: Primary | ICD-10-CM

## 2025-07-06 LAB
ALBUMIN SERPL BCG-MCNC: 4.6 G/DL (ref 3.5–5)
ALP SERPL-CCNC: 66 U/L (ref 34–104)
ALT SERPL W P-5'-P-CCNC: 14 U/L (ref 7–52)
AMPHETAMINES SERPL QL SCN: NEGATIVE
ANION GAP SERPL CALCULATED.3IONS-SCNC: 6 MMOL/L (ref 4–13)
ANISOCYTOSIS BLD QL SMEAR: PRESENT
APTT PPP: 30 SECONDS (ref 23–34)
AST SERPL W P-5'-P-CCNC: 13 U/L (ref 13–39)
ATRIAL RATE: 77 BPM
BACTERIA UR QL AUTO: ABNORMAL /HPF
BARBITURATES UR QL: NEGATIVE
BASOPHILS # BLD MANUAL: 0 THOUSAND/UL (ref 0–0.1)
BASOPHILS NFR MAR MANUAL: 0 % (ref 0–1)
BENZODIAZ UR QL: NEGATIVE
BILIRUB SERPL-MCNC: 0.67 MG/DL (ref 0.2–1)
BILIRUB UR QL STRIP: ABNORMAL
BUN SERPL-MCNC: 13 MG/DL (ref 5–25)
CALCIUM SERPL-MCNC: 9.6 MG/DL (ref 8.4–10.2)
CHLORIDE SERPL-SCNC: 100 MMOL/L (ref 96–108)
CLARITY UR: CLEAR
CO2 SERPL-SCNC: 29 MMOL/L (ref 21–32)
COCAINE UR QL: NEGATIVE
COLOR UR: YELLOW
CREAT SERPL-MCNC: 1 MG/DL (ref 0.6–1.3)
EOSINOPHIL # BLD MANUAL: 0.17 THOUSAND/UL (ref 0–0.4)
EOSINOPHIL NFR BLD MANUAL: 3 % (ref 0–6)
ERYTHROCYTE [DISTWIDTH] IN BLOOD BY AUTOMATED COUNT: 14.3 % (ref 11.6–15.1)
FENTANYL UR QL SCN: NEGATIVE
GFR SERPL CREATININE-BSD FRML MDRD: 99 ML/MIN/1.73SQ M
GLUCOSE SERPL-MCNC: 100 MG/DL (ref 65–140)
GLUCOSE SERPL-MCNC: 96 MG/DL (ref 65–140)
GLUCOSE UR STRIP-MCNC: NEGATIVE MG/DL
HCT VFR BLD AUTO: 42.9 % (ref 36.5–49.3)
HGB BLD-MCNC: 13.6 G/DL (ref 12–17)
HGB UR QL STRIP.AUTO: NEGATIVE
HYDROCODONE UR QL SCN: NEGATIVE
INR PPP: 1.15 (ref 0.85–1.19)
KETONES UR STRIP-MCNC: ABNORMAL MG/DL
LACTATE SERPL-SCNC: 1.2 MMOL/L (ref 0.5–2)
LEUKOCYTE ESTERASE UR QL STRIP: NEGATIVE
LG PLATELETS BLD QL SMEAR: PRESENT
LIPASE SERPL-CCNC: 23 U/L (ref 11–82)
LYMPHOCYTES # BLD AUTO: 2.13 THOUSAND/UL (ref 0.6–4.47)
LYMPHOCYTES # BLD AUTO: 37 % (ref 14–44)
MAGNESIUM SERPL-MCNC: 1.9 MG/DL (ref 1.9–2.7)
MCH RBC QN AUTO: 23.3 PG (ref 26.8–34.3)
MCHC RBC AUTO-ENTMCNC: 31.7 G/DL (ref 31.4–37.4)
MCV RBC AUTO: 74 FL (ref 82–98)
METHADONE UR QL: NEGATIVE
MONOCYTES # BLD AUTO: 0.92 THOUSAND/UL (ref 0–1.22)
MONOCYTES NFR BLD: 16 % (ref 4–12)
MUCOUS THREADS UR QL AUTO: ABNORMAL
NEUTROPHILS # BLD MANUAL: 2.53 THOUSAND/UL (ref 1.85–7.62)
NEUTS SEG NFR BLD AUTO: 44 % (ref 43–75)
NITRITE UR QL STRIP: NEGATIVE
NON-SQ EPI CELLS URNS QL MICRO: ABNORMAL /HPF
OPIATES UR QL SCN: NEGATIVE
OXYCODONE+OXYMORPHONE UR QL SCN: NEGATIVE
P AXIS: 61 DEGREES
PCP UR QL: NEGATIVE
PH UR STRIP.AUTO: 5.5 [PH] (ref 4.5–8)
PLATELET # BLD AUTO: 136 THOUSANDS/UL (ref 149–390)
PLATELET BLD QL SMEAR: ADEQUATE
PMV BLD AUTO: 12 FL (ref 8.9–12.7)
POTASSIUM SERPL-SCNC: 3.5 MMOL/L (ref 3.5–5.3)
PR INTERVAL: 144 MS
PROT SERPL-MCNC: 7.8 G/DL (ref 6.4–8.4)
PROT UR STRIP-MCNC: ABNORMAL MG/DL
PROTHROMBIN TIME: 14.9 SECONDS (ref 12.3–15)
QRS AXIS: 88 DEGREES
QRSD INTERVAL: 110 MS
QT INTERVAL: 358 MS
QTC INTERVAL: 405 MS
RBC # BLD AUTO: 5.84 MILLION/UL (ref 3.88–5.62)
RBC #/AREA URNS AUTO: ABNORMAL /HPF
RBC MORPH BLD: PRESENT
SODIUM SERPL-SCNC: 135 MMOL/L (ref 135–147)
SP GR UR STRIP.AUTO: >=1.03 (ref 1–1.03)
T WAVE AXIS: 22 DEGREES
THC UR QL: NEGATIVE
UROBILINOGEN UR QL STRIP.AUTO: 0.2 E.U./DL
VENTRICULAR RATE: 77 BPM
WBC # BLD AUTO: 5.75 THOUSAND/UL (ref 4.31–10.16)
WBC #/AREA URNS AUTO: ABNORMAL /HPF

## 2025-07-06 PROCEDURE — 99284 EMERGENCY DEPT VISIT MOD MDM: CPT

## 2025-07-06 PROCEDURE — 85007 BL SMEAR W/DIFF WBC COUNT: CPT | Performed by: EMERGENCY MEDICINE

## 2025-07-06 PROCEDURE — 83605 ASSAY OF LACTIC ACID: CPT | Performed by: EMERGENCY MEDICINE

## 2025-07-06 PROCEDURE — 93005 ELECTROCARDIOGRAM TRACING: CPT

## 2025-07-06 PROCEDURE — 80053 COMPREHEN METABOLIC PANEL: CPT | Performed by: EMERGENCY MEDICINE

## 2025-07-06 PROCEDURE — 96374 THER/PROPH/DIAG INJ IV PUSH: CPT

## 2025-07-06 PROCEDURE — 83735 ASSAY OF MAGNESIUM: CPT | Performed by: EMERGENCY MEDICINE

## 2025-07-06 PROCEDURE — 93010 ELECTROCARDIOGRAM REPORT: CPT | Performed by: INTERNAL MEDICINE

## 2025-07-06 PROCEDURE — 82948 REAGENT STRIP/BLOOD GLUCOSE: CPT

## 2025-07-06 PROCEDURE — 99285 EMERGENCY DEPT VISIT HI MDM: CPT | Performed by: EMERGENCY MEDICINE

## 2025-07-06 PROCEDURE — 85610 PROTHROMBIN TIME: CPT | Performed by: EMERGENCY MEDICINE

## 2025-07-06 PROCEDURE — 74177 CT ABD & PELVIS W/CONTRAST: CPT

## 2025-07-06 PROCEDURE — 83690 ASSAY OF LIPASE: CPT | Performed by: EMERGENCY MEDICINE

## 2025-07-06 PROCEDURE — 80307 DRUG TEST PRSMV CHEM ANLYZR: CPT | Performed by: EMERGENCY MEDICINE

## 2025-07-06 PROCEDURE — 36415 COLL VENOUS BLD VENIPUNCTURE: CPT | Performed by: EMERGENCY MEDICINE

## 2025-07-06 PROCEDURE — 85027 COMPLETE CBC AUTOMATED: CPT | Performed by: EMERGENCY MEDICINE

## 2025-07-06 PROCEDURE — 96375 TX/PRO/DX INJ NEW DRUG ADDON: CPT

## 2025-07-06 PROCEDURE — 81001 URINALYSIS AUTO W/SCOPE: CPT

## 2025-07-06 PROCEDURE — 96361 HYDRATE IV INFUSION ADD-ON: CPT

## 2025-07-06 PROCEDURE — 85730 THROMBOPLASTIN TIME PARTIAL: CPT | Performed by: EMERGENCY MEDICINE

## 2025-07-06 RX ORDER — OMEPRAZOLE 20 MG/1
20 CAPSULE, DELAYED RELEASE ORAL DAILY
Qty: 14 CAPSULE | Refills: 0 | Status: SHIPPED | OUTPATIENT
Start: 2025-07-06 | End: 2025-07-20

## 2025-07-06 RX ORDER — SUCRALFATE 1 G/1
1 TABLET ORAL ONCE
Status: COMPLETED | OUTPATIENT
Start: 2025-07-06 | End: 2025-07-06

## 2025-07-06 RX ORDER — SUCRALFATE 1 G/1
1 TABLET ORAL 4 TIMES DAILY
Qty: 28 TABLET | Refills: 0 | Status: SHIPPED | OUTPATIENT
Start: 2025-07-06 | End: 2025-07-13

## 2025-07-06 RX ORDER — ONDANSETRON 4 MG/1
4 TABLET, ORALLY DISINTEGRATING ORAL EVERY 6 HOURS PRN
Qty: 20 TABLET | Refills: 0 | Status: SHIPPED | OUTPATIENT
Start: 2025-07-06

## 2025-07-06 RX ORDER — FAMOTIDINE 10 MG/ML
20 INJECTION, SOLUTION INTRAVENOUS ONCE
Status: COMPLETED | OUTPATIENT
Start: 2025-07-06 | End: 2025-07-06

## 2025-07-06 RX ORDER — METOCLOPRAMIDE HYDROCHLORIDE 5 MG/ML
10 INJECTION INTRAMUSCULAR; INTRAVENOUS ONCE
Status: COMPLETED | OUTPATIENT
Start: 2025-07-06 | End: 2025-07-06

## 2025-07-06 RX ORDER — DIPHENHYDRAMINE HYDROCHLORIDE 50 MG/ML
25 INJECTION, SOLUTION INTRAMUSCULAR; INTRAVENOUS ONCE
Status: COMPLETED | OUTPATIENT
Start: 2025-07-06 | End: 2025-07-06

## 2025-07-06 RX ADMIN — SODIUM CHLORIDE 1000 ML: 0.9 INJECTION, SOLUTION INTRAVENOUS at 16:16

## 2025-07-06 RX ADMIN — FAMOTIDINE 20 MG: 10 INJECTION, SOLUTION INTRAVENOUS at 16:19

## 2025-07-06 RX ADMIN — DIPHENHYDRAMINE HYDROCHLORIDE 25 MG: 50 INJECTION INTRAMUSCULAR; INTRAVENOUS at 16:16

## 2025-07-06 RX ADMIN — METOCLOPRAMIDE 10 MG: 5 INJECTION, SOLUTION INTRAMUSCULAR; INTRAVENOUS at 16:17

## 2025-07-06 RX ADMIN — SODIUM CHLORIDE 1000 ML: 0.9 INJECTION, SOLUTION INTRAVENOUS at 18:22

## 2025-07-06 RX ADMIN — SUCRALFATE 1 G: 1 TABLET ORAL at 16:15

## 2025-07-06 RX ADMIN — IOHEXOL 100 ML: 350 INJECTION, SOLUTION INTRAVENOUS at 17:45

## 2025-07-06 NOTE — ED PROVIDER NOTES
Time reflects when diagnosis was documented in both MDM as applicable and the Disposition within this note       Time User Action Codes Description Comment    7/6/2025  6:08 PM Bendock, Elle L Add [R11.2] Nausea & vomiting     7/6/2025  6:08 PM Bendock, Elle L Add [E86.0] Dehydration     7/6/2025  6:11 PM Bendock, Elle L Add [R11.2] Nausea and vomiting     7/6/2025  6:13 PM Bendock, Elle L Add [Z72.4] Eating problem     7/6/2025  6:17 PM Bendock, Elle L Add [D75.839] Thrombocytosis     7/6/2025  6:17 PM Bendock, Elle L Remove [D75.839] Thrombocytosis     7/6/2025  6:17 PM Bendock, Elle L Add [D69.6] Thrombocytopenia (HCC)           ED Disposition       None          Assessment & Plan       Medical Decision Making      Differential diagnosis includes but not limited to:  Viral etiology, biliary colic, cholecystitis, obstruction, gastritis, diabetic gastroparesis, appendicitis, hepatitis, mi, AFib, torsion, kidney stones, DKA, increased ICP, meningitis, withdrawal from medication, psychogenic, radiation, migraines, labyrinthitis, Meniere's      Will obtain EKG to rule out and assess for STEMI versus arrhythmia versuso interval abnormality versus ischemic changes; will also obtain lipase for further evaluation of pancreas.  ; CBC to assess for leukocytosis or anemia; CMP to assess for LYDIA versus electrolyte abnormalities versus elevated LFTs.  Will also obtain CT abdomen pelvis with IV and p.o.      Amount and/or Complexity of Data Reviewed  Independent Historian: spouse     Details:   Patient's wife  External Data Reviewed: notes.     Details:   Chart review shows the patient was seen on June 30 as well as July 1 for same thing.  He followed up with PCP on July 2.  Patient was diagnosed with viral gastroenteritis and was reported to be taken Reglan and Pepcid.    Labs: ordered. Decision-making details documented in ED Course.     Details:   No anemia or leukocytosis.  Noted chronic thrombocytopenia  UA  "with Ketones. No Glucose, LE or Nitrite  Drug screening negative  No acute kidney injury or electrolyte abnormality  Lactic acid and lipase within normal          Radiology: ordered.     Details:     CT   ECG/medicine tests: ordered and independent interpretation performed. Decision-making details documented in ED Course.     Details:   No ischemia or arrhythmia    Risk  Prescription drug management.        ED Course as of 07/06/25 1825   Sun Jul 06, 2025   1605 Patient is a 34-year-old male returns to the ER for persistent vomiting.  On exam he is resting in bed in no distress hemodynamically stable.  He has nonperitoneal on exam vital signs are stable.  Will review chart, check labs as well as urine and urine drug screen.  Will have patient check for CT      Disclosure: Voice to text software was used in the preparation of this document and could have resulted in translational errors.      Occasional wrong word or \"sound a like\" substitutions may have occurred due to the inherent limitations of voice recognition software.  Read the chart carefully and recognize, using context, where substitutions have occurred.       I have independently reviewed external records are available to me to the level of detail possible within the time constraints of my patient care responsibilities in the ED.       1626 CBC and differential(!)  Chronically low platelets       1629 Urine Macroscopic, POC(!)  + Ketones and protein. No LE or Nitrite       1656 Lactic acid, plasma (w/reflex if result > 2.0)  WNL       1656 Lipase  WNL       1656 Comprehensive metabolic panel  WNL       1656 Rapid drug screen, urine  Negative       1656 Patient's labs are essentially normal.  He does have ketones in urine and receiving IV fluid.  Pending CT       1720 Patient resting in bed.  He is tolerating p.o. well.  Updated on labs and urine.       1810 Pending CT results.  Will give second liter IV fluids while waiting.  Patient is on Pepcid at home.  " "Will refer to gastroenterology and change from a histamine blocker to PPI.  He does have Zofran and Reglan at home       1821 Pending CT results. Signed out to Dr Ennis.            Medications   sodium chloride 0.9 % bolus 1,000 mL (1,000 mL Intravenous New Bag 7/6/25 1822)   sodium chloride 0.9 % bolus 1,000 mL (0 mL Intravenous Stopped 7/6/25 1737)   Famotidine (PF) (PEPCID) injection 20 mg (20 mg Intravenous Given 7/6/25 1619)   metoclopramide (REGLAN) injection 10 mg (10 mg Intravenous Given 7/6/25 1617)   diphenhydrAMINE (BENADRYL) injection 25 mg (25 mg Intravenous Given 7/6/25 1616)   sucralfate (CARAFATE) tablet 1 g (1 g Oral Given 7/6/25 1615)   iohexol (OMNIPAQUE) 350 MG/ML injection (MULTI-DOSE) 100 mL (100 mL Intravenous Given 7/6/25 1745)       ED Risk Strat Scores                    No data recorded                            History of Present Illness       Chief Complaint   Patient presents with    Vomiting     Patient reports vomiting for past week, seen for same recently        Past Medical History[1]   Past Surgical History[2]   Family History[3]   Social History[4]   E-Cigarette/Vaping    E-Cigarette Use Never User       E-Cigarette/Vaping Substances    Nicotine No     THC No     CBD No     Flavoring No     Other No     Unknown No       I have reviewed and agree with the history as documented.     Patient is a 34 year old male coming in with onset of vomiting that started about a week ago and persisted since then. Patient states he has been seen twice this week and follow up with his PCP. He was given famotidine, Zofran and Reglan which he states has not been helping. He attempts to eat or drink, then minutes later he vomits. He has no hematemesis, coffee ground emesis, diarrhea, BRBPR.  He states that it feels \"like something gets stuck\" and points to the epigastric region.  He has no family history of Crohn's or ulcerative colitis.  He has never had an EGD or colonoscopy.  He states that " "there are some \"baby snakes\" that he was exposed to and worried that kind of got him infected.  He has no abdominal surgery, recent travel or sick contacts      History provided by:  Patient, medical records and spouse   used: No    Vomiting  Severity:  Moderate  Duration:  1 week  Timing:  Constant  Quality:  Unable to specify  Onset of vomiting after eating: minutes.  Progression:  Unchanged  Chronicity:  Recurrent  Recent urination:  Decreased  Context: not post-tussive and not self-induced    Relieved by:  Nothing  Worsened by:  Nothing  Ineffective treatments:  Antiemetics and ice chips  Associated symptoms: no abdominal pain, no arthralgias, no chills, no cough, no diarrhea, no fever, no headaches, no myalgias, no sore throat and no URI    Risk factors: no alcohol use, no diabetes (prediabetes), no prior abdominal surgery, no suspect food intake and no travel to endemic areas        Review of Systems   Constitutional: Negative.  Negative for chills and fever.   HENT: Negative.  Negative for ear pain and sore throat.    Eyes: Negative.  Negative for pain and visual disturbance.   Respiratory: Negative.  Negative for cough and shortness of breath.    Cardiovascular:  Negative for chest pain and palpitations.   Gastrointestinal:  Positive for vomiting. Negative for abdominal pain and diarrhea.   Genitourinary: Negative.  Negative for dysuria and hematuria.   Musculoskeletal: Negative.  Negative for arthralgias, back pain and myalgias.   Skin:  Negative for color change and rash.   Neurological:  Negative for seizures, syncope and headaches.   Hematological: Negative.    Psychiatric/Behavioral: Negative.     All other systems reviewed and are negative.          Objective       ED Triage Vitals   Temperature Pulse Blood Pressure Respirations SpO2 Patient Position - Orthostatic VS   07/06/25 1540 07/06/25 1540 07/06/25 1540 07/06/25 1540 07/06/25 1540 07/06/25 1540   98.2 °F (36.8 °C) 85 135/74 " 18 98 % Sitting      Temp Source Heart Rate Source BP Location FiO2 (%) Pain Score    07/06/25 1540 07/06/25 1823 07/06/25 1540 -- 07/06/25 1737    Oral Monitor Right arm  4      Vitals      Date and Time Temp Pulse SpO2 Resp BP Pain Score FACES Pain Rating User   07/06/25 1823 -- 73 92 % 18 141/91 -- -- CF   07/06/25 1737 -- -- -- -- -- 4 -- CF   07/06/25 1540 98.2 °F (36.8 °C) 85 98 % 18 135/74 -- -- BLG            Physical Exam  Vitals and nursing note reviewed.   Constitutional:       General: He is not in acute distress.     Appearance: Normal appearance. He is well-developed and overweight.   HENT:      Head: Normocephalic and atraumatic.      Mouth/Throat:      Lips: Pink.      Mouth: Mucous membranes are moist.      Pharynx: Oropharynx is clear.      Comments: Patient maintaining airway and secretions. No stridor . No brawniness under tongue.         Eyes:      General: Lids are normal. Gaze aligned appropriately.      Extraocular Movements: Extraocular movements intact.      Conjunctiva/sclera: Conjunctivae normal.      Pupils: Pupils are equal, round, and reactive to light.       Cardiovascular:      Rate and Rhythm: Normal rate and regular rhythm.      Pulses:           Radial pulses are 2+ on the right side and 2+ on the left side.        Dorsalis pedis pulses are 2+ on the right side and 2+ on the left side.      Heart sounds: Normal heart sounds, S1 normal and S2 normal. No murmur heard.  Pulmonary:      Effort: Pulmonary effort is normal. No respiratory distress.      Breath sounds: Normal breath sounds.   Abdominal:      General: Bowel sounds are normal.      Palpations: Abdomen is soft.      Tenderness: There is no abdominal tenderness. There is no guarding or rebound. Negative signs include Buitrago's sign, Rovsing's sign and McBurney's sign.     Musculoskeletal:         General: No swelling.      Cervical back: Neck supple.      Right lower leg: No edema.      Left lower leg: No edema.     Skin:      General: Skin is warm and dry.      Capillary Refill: Capillary refill takes less than 2 seconds.     Neurological:      General: No focal deficit present.      Mental Status: He is alert and oriented to person, place, and time.      GCS: GCS eye subscore is 4. GCS verbal subscore is 5. GCS motor subscore is 6.      Cranial Nerves: Cranial nerves 2-12 are intact.      Sensory: Sensation is intact.     Psychiatric:         Mood and Affect: Mood normal.         Behavior: Behavior is cooperative.         Results Reviewed       Procedure Component Value Units Date/Time    Manual Differential(PHLEBS Do Not Order) [[id number]]  (Abnormal) Collected: 07/06/25 1614    Lab Status: Final result Specimen: Blood from Arm, Left Updated: 07/06/25 1714     Segmented % 44 %      Lymphocytes % 37 %      Monocytes % 16 %      Eosinophils % 3 %      Basophils % 0 %      Absolute Neutrophils 2.53 Thousand/uL      Absolute Lymphocytes 2.13 Thousand/uL      Absolute Monocytes 0.92 Thousand/uL      Absolute Eosinophils 0.17 Thousand/uL      Absolute Basophils 0.00 Thousand/uL      Total Counted --     RBC Morphology Present     Platelet Estimate Adequate     Large Platelet Present     Anisocytosis Present    Rapid drug screen, urine [[id number]]  (Normal) Collected: 07/06/25 1627    Lab Status: Final result Specimen: Urine, Clean Catch Updated: 07/06/25 1651     Amph/Meth UR Negative     Barbiturate Ur Negative     Benzodiazepine Urine Negative     Cocaine Urine Negative     Methadone Urine Negative     Opiate Urine Negative     PCP Ur Negative     THC Urine Negative     Oxycodone Urine Negative     Fentanyl Urine Negative     HYDROCODONE URINE Negative    Narrative:      FOR MEDICAL PURPOSES ONLY.   IF CONFIRMATION NEEDED PLEASE CONTACT THE LAB WITHIN 5 DAYS.    Drug Screen Cutoff Levels:  AMPHETAMINE/METHAMPHETAMINES  1000 ng/mL  BARBITURATES     200 ng/mL  BENZODIAZEPINES     200 ng/mL  COCAINE      300 ng/mL  METHADONE      300  ng/mL  OPIATES      300 ng/mL  PHENCYCLIDINE     25 ng/mL  THC       50 ng/mL  OXYCODONE      100 ng/mL  FENTANYL      5 ng/mL  HYDROCODONE     300 ng/mL    Urine Microscopic [[id number]]  (Abnormal) Collected: 07/06/25 1626    Lab Status: Final result Specimen: Urine, Clean Catch Updated: 07/06/25 1642     RBC, UA 1-2 /hpf      WBC, UA 1-2 /hpf      Epithelial Cells Occasional /hpf      Bacteria, UA Occasional /hpf      MUCUS THREADS Innumerable    Lactic acid, plasma (w/reflex if result > 2.0) [[id number]]  (Normal) Collected: 07/06/25 1614    Lab Status: Final result Specimen: Blood from Arm, Left Updated: 07/06/25 1639     LACTIC ACID 1.2 mmol/L     Narrative:      Result may be elevated if tourniquet was used during collection.    Comprehensive metabolic panel [[id number]] Collected: 07/06/25 1614    Lab Status: Final result Specimen: Blood from Arm, Left Updated: 07/06/25 1639     Sodium 135 mmol/L      Potassium 3.5 mmol/L      Chloride 100 mmol/L      CO2 29 mmol/L      ANION GAP 6 mmol/L      BUN 13 mg/dL      Creatinine 1.00 mg/dL      Glucose 100 mg/dL      Calcium 9.6 mg/dL      AST 13 U/L      ALT 14 U/L      Alkaline Phosphatase 66 U/L      Total Protein 7.8 g/dL      Albumin 4.6 g/dL      Total Bilirubin 0.67 mg/dL      eGFR 99 ml/min/1.73sq m     Narrative:      [employer] guidelines for Chronic Kidney Disease (CKD):     Stage 1 with normal or high GFR (GFR > 90 mL/min/1.73 square meters)    Stage 2 Mild CKD (GFR = 60-89 mL/min/1.73 square meters)    Stage 3A Moderate CKD (GFR = 45-59 mL/min/1.73 square meters)    Stage 3B Moderate CKD (GFR = 30-44 mL/min/1.73 square meters)    Stage 4 Severe CKD (GFR = 15-29 mL/min/1.73 square meters)    Stage 5 End Stage CKD (GFR <15 mL/min/1.73 square meters)  Note: GFR calculation is accurate only with a steady state creatinine    Lipase [[id number]]  (Normal) Collected: 07/06/25 1614    Lab Status: Final result Specimen: Blood from Arm, Left  Updated: 07/06/25 1639     Lipase 23 u/L     Magnesium [[id number]]  (Normal) Collected: 07/06/25 1614    Lab Status: Final result Specimen: Blood from Arm, Left Updated: 07/06/25 1639     Magnesium 1.9 mg/dL     Protime-INR [[id number]]  (Normal) Collected: 07/06/25 1614    Lab Status: Final result Specimen: Blood from Arm, Left Updated: 07/06/25 1637     Protime 14.9 seconds      INR 1.15    Narrative:      INR Therapeutic Range    Indication                                             INR Range      Atrial Fibrillation                                               2.0-3.0  Hypercoagulable State                                    2.0.2.3  Left Ventricular Asist Device                            2.0-3.0  Mechanical Heart Valve                                  -    Aortic(with afib, MI, embolism, HF, LA enlargement,    and/or coagulopathy)                                     2.0-3.0 (2.5-3.5)     Mitral                                                             2.5-3.5  Prosthetic/Bioprosthetic Heart Valve               2.0-3.0  Venous thromboembolism (VTE: VT, PE        2.0-3.0    APTT [[id number]]  (Normal) Collected: 07/06/25 1614    Lab Status: Final result Specimen: Blood from Arm, Left Updated: 07/06/25 1637     PTT 30 seconds     CBC and differential [[id number]]  (Abnormal) Collected: 07/06/25 1614    Lab Status: Final result Specimen: Blood from Arm, Left Updated: 07/06/25 1630     WBC 5.75 Thousand/uL      RBC 5.84 Million/uL      Hemoglobin 13.6 g/dL      Hematocrit 42.9 %      MCV 74 fL      MCH 23.3 pg      MCHC 31.7 g/dL      RDW 14.3 %      MPV 12.0 fL      Platelets 136 Thousands/uL     Narrative:      This is an appended report.  These results have been appended to a previously verified report.    Urine Macroscopic, POC [[id number]]  (Abnormal) Collected: 07/06/25 1626    Lab Status: Final result Specimen: Urine Updated: 07/06/25 1628     Color, UA Yellow     Clarity, UA Clear     pH, UA 5.5      Leukocytes, UA Negative     Nitrite, UA Negative     Protein, UA 30 (1+) mg/dl      Glucose, UA Negative mg/dl      Ketones, UA 15 (1+) mg/dl      Urobilinogen, UA 0.2 E.U./dl      Bilirubin, UA Small     Occult Blood, UA Negative     Specific Gravity, UA >=1.030    Narrative:      CLINITEK RESULT    Fingerstick Glucose (POCT) [[id number]]  (Normal) Collected: 07/06/25 1625    Lab Status: Final result Specimen: Blood Updated: 07/06/25 1625     POC Glucose 96 mg/dl             CT abdomen pelvis w contrast    (Results Pending)       ECG 12 Lead Documentation Only    Date/Time: 7/6/2025 4:32 PM    Performed by: Elle Sapp DO  Authorized by: Elle Sapp DO    Indications / Diagnosis:  Vomiting/epigastric problem  ECG reviewed by me, the ED Provider: yes    Patient location:  ED  Previous ECG:     Previous ECG:  Unavailable  Interpretation:     Interpretation: non-specific    Quality:     Tracing quality:  Limited by artifact  Rate:     ECG rate:  77    ECG rate assessment: normal    Rhythm:     Rhythm: sinus rhythm    Ectopy:     Ectopy: none    QRS:     QRS axis:  Normal    QRS intervals:  Normal  Conduction:     Conduction: normal    ST segments:     ST segments:  Non-specific  T waves:     T waves: non-specific    Comments:      QTC @ 405 ms  Normal axis        ED Medication and Procedure Management   Prior to Admission Medications   Prescriptions Last Dose Informant Patient Reported? Taking?   famotidine (PEPCID) 20 mg tablet   No No   Sig: Take 1 tablet (20 mg total) by mouth daily at bedtime   hyoscyamine (ANASPAZ,LEVSIN) 0.125 MG tablet   No No   Sig: Take 1 tablet (0.125 mg total) by mouth every 4 (four) hours as needed for cramping   Patient not taking: Reported on 7/2/2025   metoclopramide (REGLAN) 5 mg/5 mL oral solution   No No   Sig: Take 10 mL (10 mg total) by mouth 4 (four) times a day (before meals and at bedtime)   nystatin (MYCOSTATIN) powder   No No   Sig: Apply topically 2 (two) times a  day   ondansetron (ZOFRAN-ODT) 4 mg disintegrating tablet   No No   Sig: Take 1 tablet (4 mg total) by mouth every 6 (six) hours as needed for nausea   ondansetron (ZOFRAN-ODT) 4 mg disintegrating tablet   No Yes   Sig: Take 1 tablet (4 mg total) by mouth every 6 (six) hours as needed for nausea   terbinafine (LamISIL) 1 % cream   No No   Sig: Apply topically 2 (two) times a day      Facility-Administered Medications: None     Patient's Medications   Discharge Prescriptions    OMEPRAZOLE (PRILOSEC) 20 MG DELAYED RELEASE CAPSULE    Take 1 capsule (20 mg total) by mouth daily for 14 days       Start Date: 7/6/2025  End Date: 7/20/2025       Order Dose: 20 mg       Quantity: 14 capsule    Refills: 0    SUCRALFATE (CARAFATE) 1 G TABLET    Take 1 tablet (1 g total) by mouth 4 (four) times a day for 7 days       Start Date: 7/6/2025  End Date: 7/13/2025       Order Dose: 1 g       Quantity: 28 tablet    Refills: 0       ED SEPSIS DOCUMENTATION   Time reflects when diagnosis was documented in both MDM as applicable and the Disposition within this note       Time User Action Codes Description Comment    7/6/2025  6:08 PM Bendock, Elle L Add [R11.2] Nausea & vomiting     7/6/2025  6:08 PM Bendock, Elle L Add [E86.0] Dehydration     7/6/2025  6:11 PM Bendock, Elle L Add [R11.2] Nausea and vomiting     7/6/2025  6:13 PM Bendock, Elle L Add [Z72.4] Eating problem     7/6/2025  6:17 PM Bendock, Elle L Add [D75.839] Thrombocytosis     7/6/2025  6:17 PM Bendock, Elle L Remove [D75.839] Thrombocytosis     7/6/2025  6:17 PM Bendock, Elle L Add [D69.6] Thrombocytopenia (HCC)                      [1] No past medical history on file.  [2] No past surgical history on file.  [3]   Family History  Problem Relation Name Age of Onset    Hypertension Mother      Diabetes Mother      Lung disease Mother      No Known Problems Father      Depression Sister      No Known Problems Sister      No Known Problems Brother      No Known  Problems Brother      No Known Problems Brother     [4]   Social History  Tobacco Use    Smoking status: Some Days     Types: Cigarettes, Cigars     Start date: 2022     Passive exposure: Current    Smokeless tobacco: Never   Vaping Use    Vaping status: Never Used   Substance Use Topics    Alcohol use: Yes     Comment: SOCIALLY    Drug use: Never        Elle Sapp DO  07/06/25 5820

## 2025-07-06 NOTE — DISCHARGE INSTRUCTIONS
STOP TAKE FAMOTIDINE AND START TODAY OMEPRAZOLE    PLEASE CALL GASTROENTEROLOGY TOMORROW TO GET AN APPOINTMENT    EAT SMALL, MORE FREQUENT MEALS NOT LARGE MEALS    AVOID FRIED FOODS, GREASY FOODS, SPICY FOODS, AND FAST FOODS

## 2025-07-06 NOTE — Clinical Note
Paolo Monge was seen and treated in our emergency department on 7/6/2025.    No restrictions            Diagnosis:     Paolo  may return to work on return date.    He may return on this date: 07/12/2025         If you have any questions or concerns, please don't hesitate to call.      Lucia Ennis, DO    ______________________________           _______________          _______________  Hospital Representative                              Date                                Time

## 2025-08-01 PROBLEM — A08.4 VIRAL GASTROENTERITIS: Status: RESOLVED | Noted: 2025-07-02 | Resolved: 2025-08-01
